# Patient Record
Sex: FEMALE | Race: BLACK OR AFRICAN AMERICAN | NOT HISPANIC OR LATINO | URBAN - METROPOLITAN AREA
[De-identification: names, ages, dates, MRNs, and addresses within clinical notes are randomized per-mention and may not be internally consistent; named-entity substitution may affect disease eponyms.]

---

## 2018-10-04 ENCOUNTER — OFFICE (OUTPATIENT)
Dept: URBAN - METROPOLITAN AREA CLINIC 10 | Facility: CLINIC | Age: 63
End: 2018-10-04

## 2018-10-04 VITALS
HEIGHT: 66 IN | WEIGHT: 252 LBS | DIASTOLIC BLOOD PRESSURE: 94 MMHG | SYSTOLIC BLOOD PRESSURE: 196 MMHG | HEART RATE: 51 BPM

## 2018-10-04 DIAGNOSIS — K59.00 CONSTIPATION, UNSPECIFIED: ICD-10-CM

## 2018-10-04 DIAGNOSIS — K76.0 FATTY (CHANGE OF) LIVER, NOT ELSEWHERE CLASSIFIED: ICD-10-CM

## 2018-10-04 DIAGNOSIS — K30 FUNCTIONAL DYSPEPSIA: ICD-10-CM

## 2018-10-04 LAB
COMP. METABOLIC PANEL (14): A/G RATIO: 1.4 (ref 1.2–2.2)
COMP. METABOLIC PANEL (14): ALBUMIN: 4.3 G/DL (ref 3.6–4.8)
COMP. METABOLIC PANEL (14): ALKALINE PHOSPHATASE: 201 IU/L — HIGH (ref 39–117)
COMP. METABOLIC PANEL (14): ALT (SGPT): 23 IU/L (ref 0–32)
COMP. METABOLIC PANEL (14): AST (SGOT): 15 IU/L (ref 0–40)
COMP. METABOLIC PANEL (14): BILIRUBIN, TOTAL: <0.2 MG/DL
COMP. METABOLIC PANEL (14): BUN/CREATININE RATIO: 16 (ref 12–28)
COMP. METABOLIC PANEL (14): BUN: 14 MG/DL (ref 8–27)
COMP. METABOLIC PANEL (14): CALCIUM: 9.7 MG/DL (ref 8.7–10.3)
COMP. METABOLIC PANEL (14): CARBON DIOXIDE, TOTAL: 25 MMOL/L (ref 20–29)
COMP. METABOLIC PANEL (14): CHLORIDE: 100 MMOL/L (ref 96–106)
COMP. METABOLIC PANEL (14): CREATININE: 0.89 MG/DL (ref 0.57–1)
COMP. METABOLIC PANEL (14): EGFR IF AFRICN AM: 81 ML/MIN/1.73 (ref 59–?)
COMP. METABOLIC PANEL (14): EGFR IF NONAFRICN AM: 70 ML/MIN/1.73 (ref 59–?)
COMP. METABOLIC PANEL (14): GLOBULIN, TOTAL: 3.1 G/DL (ref 1.5–4.5)
COMP. METABOLIC PANEL (14): GLUCOSE: 256 MG/DL — HIGH (ref 65–99)
COMP. METABOLIC PANEL (14): POTASSIUM: 4.7 MMOL/L (ref 3.5–5.2)
COMP. METABOLIC PANEL (14): PROTEIN, TOTAL: 7.4 G/DL (ref 6–8.5)
COMP. METABOLIC PANEL (14): SODIUM: 140 MMOL/L (ref 134–144)

## 2018-10-04 PROCEDURE — 99203 OFFICE O/P NEW LOW 30 MIN: CPT | Performed by: INTERNAL MEDICINE

## 2018-10-04 RX ORDER — PANTOPRAZOLE SODIUM 40 MG/1
40 TABLET, DELAYED RELEASE ORAL
Qty: 90 | Refills: 3 | Status: ACTIVE
Start: 2018-10-04

## 2018-11-14 ENCOUNTER — AMBULATORY SURGICAL CENTER (OUTPATIENT)
Dept: URBAN - METROPOLITAN AREA SURGERY 1 | Facility: SURGERY | Age: 63
End: 2018-11-14

## 2018-11-14 ENCOUNTER — AMBULATORY SURGICAL CENTER (OUTPATIENT)
Dept: URBAN - METROPOLITAN AREA SURGERY 1 | Facility: SURGERY | Age: 63
End: 2018-11-14
Payer: MEDICARE

## 2018-11-14 ENCOUNTER — OFFICE (OUTPATIENT)
Dept: URBAN - METROPOLITAN AREA PATHOLOGY 22 | Facility: PATHOLOGY | Age: 63
End: 2018-11-14

## 2018-11-14 VITALS
HEART RATE: 57 BPM | SYSTOLIC BLOOD PRESSURE: 134 MMHG | SYSTOLIC BLOOD PRESSURE: 126 MMHG | SYSTOLIC BLOOD PRESSURE: 148 MMHG | TEMPERATURE: 98.1 F | SYSTOLIC BLOOD PRESSURE: 117 MMHG | RESPIRATION RATE: 18 BRPM | HEART RATE: 57 BPM | RESPIRATION RATE: 16 BRPM | DIASTOLIC BLOOD PRESSURE: 57 MMHG | DIASTOLIC BLOOD PRESSURE: 70 MMHG | DIASTOLIC BLOOD PRESSURE: 70 MMHG | DIASTOLIC BLOOD PRESSURE: 62 MMHG | SYSTOLIC BLOOD PRESSURE: 116 MMHG | OXYGEN SATURATION: 97 % | DIASTOLIC BLOOD PRESSURE: 78 MMHG | DIASTOLIC BLOOD PRESSURE: 72 MMHG | SYSTOLIC BLOOD PRESSURE: 117 MMHG | TEMPERATURE: 98.1 F | TEMPERATURE: 98.6 F | OXYGEN SATURATION: 100 % | HEART RATE: 70 BPM | HEART RATE: 65 BPM | HEART RATE: 70 BPM | OXYGEN SATURATION: 97 % | DIASTOLIC BLOOD PRESSURE: 62 MMHG | SYSTOLIC BLOOD PRESSURE: 126 MMHG | RESPIRATION RATE: 18 BRPM | HEART RATE: 65 BPM | WEIGHT: 253 LBS | DIASTOLIC BLOOD PRESSURE: 78 MMHG | HEART RATE: 64 BPM | WEIGHT: 253 LBS | DIASTOLIC BLOOD PRESSURE: 57 MMHG | OXYGEN SATURATION: 100 % | OXYGEN SATURATION: 98 % | SYSTOLIC BLOOD PRESSURE: 134 MMHG | SYSTOLIC BLOOD PRESSURE: 116 MMHG | DIASTOLIC BLOOD PRESSURE: 72 MMHG | RESPIRATION RATE: 16 BRPM | TEMPERATURE: 98.6 F | HEART RATE: 64 BPM | OXYGEN SATURATION: 98 % | OXYGEN SATURATION: 93 % | OXYGEN SATURATION: 93 % | SYSTOLIC BLOOD PRESSURE: 148 MMHG | HEIGHT: 66 IN | HEIGHT: 66 IN

## 2018-11-14 DIAGNOSIS — K29.50 UNSPECIFIED CHRONIC GASTRITIS WITHOUT BLEEDING: ICD-10-CM

## 2018-11-14 DIAGNOSIS — B96.81 HELICOBACTER PYLORI [H. PYLORI] AS THE CAUSE OF DISEASES CLA: ICD-10-CM

## 2018-11-14 DIAGNOSIS — K30 FUNCTIONAL DYSPEPSIA: ICD-10-CM

## 2018-11-14 PROBLEM — K31.89 OTHER DISEASES OF STOMACH AND DUODENUM: Status: ACTIVE | Noted: 2018-11-14

## 2018-11-14 PROCEDURE — 88342 IMHCHEM/IMCYTCHM 1ST ANTB: CPT | Performed by: INTERNAL MEDICINE

## 2018-11-14 PROCEDURE — G8918 PT W/O PREOP ORDER IV AB PRO: HCPCS | Performed by: INTERNAL MEDICINE

## 2018-11-14 PROCEDURE — G8907 PT DOC NO EVENTS ON DISCHARG: HCPCS | Performed by: INTERNAL MEDICINE

## 2018-11-14 PROCEDURE — 43239 EGD BIOPSY SINGLE/MULTIPLE: CPT | Performed by: INTERNAL MEDICINE

## 2018-11-14 PROCEDURE — 88313 SPECIAL STAINS GROUP 2: CPT | Performed by: INTERNAL MEDICINE

## 2018-11-14 PROCEDURE — 88305 TISSUE EXAM BY PATHOLOGIST: CPT | Performed by: INTERNAL MEDICINE

## 2018-11-14 RX ORDER — LINACLOTIDE 290 UG/1
290 CAPSULE, GELATIN COATED ORAL
Qty: 90 | Refills: 3 | Status: COMPLETED
Start: 2018-11-14 | End: 2019-06-10

## 2019-02-18 ENCOUNTER — OFFICE (OUTPATIENT)
Dept: URBAN - METROPOLITAN AREA CLINIC 10 | Facility: CLINIC | Age: 64
End: 2019-02-18

## 2019-02-21 ENCOUNTER — OFFICE (OUTPATIENT)
Dept: URBAN - METROPOLITAN AREA CLINIC 10 | Facility: CLINIC | Age: 64
End: 2019-02-21

## 2019-02-21 VITALS
HEIGHT: 66 IN | WEIGHT: 257 LBS | HEART RATE: 45 BPM | SYSTOLIC BLOOD PRESSURE: 182 MMHG | DIASTOLIC BLOOD PRESSURE: 89 MMHG | SYSTOLIC BLOOD PRESSURE: 191 MMHG | DIASTOLIC BLOOD PRESSURE: 85 MMHG

## 2019-02-21 DIAGNOSIS — B96.81 HELICOBACTER PYLORI [H. PYLORI] AS THE CAUSE OF DISEASES CLA: ICD-10-CM

## 2019-02-21 DIAGNOSIS — K29.50 UNSPECIFIED CHRONIC GASTRITIS WITHOUT BLEEDING: ICD-10-CM

## 2019-02-21 DIAGNOSIS — K59.00 CONSTIPATION, UNSPECIFIED: ICD-10-CM

## 2019-02-21 PROCEDURE — 99213 OFFICE O/P EST LOW 20 MIN: CPT | Performed by: INTERNAL MEDICINE

## 2019-04-08 ENCOUNTER — OFFICE (OUTPATIENT)
Dept: URBAN - METROPOLITAN AREA CLINIC 10 | Facility: CLINIC | Age: 64
End: 2019-04-08

## 2019-04-08 VITALS
DIASTOLIC BLOOD PRESSURE: 84 MMHG | DIASTOLIC BLOOD PRESSURE: 94 MMHG | SYSTOLIC BLOOD PRESSURE: 185 MMHG | HEART RATE: 55 BPM | SYSTOLIC BLOOD PRESSURE: 174 MMHG | WEIGHT: 251 LBS | HEIGHT: 66 IN

## 2019-04-08 DIAGNOSIS — B96.81 HELICOBACTER PYLORI [H. PYLORI] AS THE CAUSE OF DISEASES CLA: ICD-10-CM

## 2019-04-08 DIAGNOSIS — K59.00 CONSTIPATION, UNSPECIFIED: ICD-10-CM

## 2019-04-08 DIAGNOSIS — K29.50 UNSPECIFIED CHRONIC GASTRITIS WITHOUT BLEEDING: ICD-10-CM

## 2019-04-08 PROCEDURE — 99213 OFFICE O/P EST LOW 20 MIN: CPT | Performed by: INTERNAL MEDICINE

## 2019-08-27 ENCOUNTER — AMBULATORY SURGICAL CENTER (OUTPATIENT)
Dept: URBAN - METROPOLITAN AREA SURGERY 1 | Facility: SURGERY | Age: 64
End: 2019-08-27

## 2019-08-27 ENCOUNTER — OFFICE (OUTPATIENT)
Dept: URBAN - METROPOLITAN AREA PATHOLOGY 22 | Facility: PATHOLOGY | Age: 64
End: 2019-08-27

## 2019-08-27 ENCOUNTER — AMBULATORY SURGICAL CENTER (OUTPATIENT)
Dept: URBAN - METROPOLITAN AREA SURGERY 1 | Facility: SURGERY | Age: 64
End: 2019-08-27
Payer: MEDICARE

## 2019-08-27 VITALS
SYSTOLIC BLOOD PRESSURE: 167 MMHG | OXYGEN SATURATION: 96 % | HEART RATE: 57 BPM | SYSTOLIC BLOOD PRESSURE: 171 MMHG | DIASTOLIC BLOOD PRESSURE: 74 MMHG | HEART RATE: 54 BPM | WEIGHT: 247 LBS | RESPIRATION RATE: 16 BRPM | SYSTOLIC BLOOD PRESSURE: 164 MMHG | RESPIRATION RATE: 16 BRPM | DIASTOLIC BLOOD PRESSURE: 80 MMHG | SYSTOLIC BLOOD PRESSURE: 167 MMHG | OXYGEN SATURATION: 96 % | DIASTOLIC BLOOD PRESSURE: 74 MMHG | HEART RATE: 78 BPM | OXYGEN SATURATION: 97 % | SYSTOLIC BLOOD PRESSURE: 171 MMHG | HEIGHT: 66 IN | TEMPERATURE: 98.5 F | HEIGHT: 66 IN | HEART RATE: 50 BPM | OXYGEN SATURATION: 94 % | DIASTOLIC BLOOD PRESSURE: 74 MMHG | OXYGEN SATURATION: 94 % | TEMPERATURE: 98.5 F | HEART RATE: 50 BPM | SYSTOLIC BLOOD PRESSURE: 178 MMHG | SYSTOLIC BLOOD PRESSURE: 171 MMHG | WEIGHT: 247 LBS | SYSTOLIC BLOOD PRESSURE: 164 MMHG | OXYGEN SATURATION: 97 % | DIASTOLIC BLOOD PRESSURE: 69 MMHG | HEART RATE: 49 BPM | RESPIRATION RATE: 16 BRPM | DIASTOLIC BLOOD PRESSURE: 78 MMHG | SYSTOLIC BLOOD PRESSURE: 178 MMHG | DIASTOLIC BLOOD PRESSURE: 78 MMHG | TEMPERATURE: 98 F | SYSTOLIC BLOOD PRESSURE: 167 MMHG | WEIGHT: 247 LBS | SYSTOLIC BLOOD PRESSURE: 144 MMHG | HEART RATE: 57 BPM | TEMPERATURE: 98.5 F | TEMPERATURE: 98 F | SYSTOLIC BLOOD PRESSURE: 178 MMHG | HEART RATE: 57 BPM | OXYGEN SATURATION: 97 % | DIASTOLIC BLOOD PRESSURE: 80 MMHG | HEIGHT: 66 IN | SYSTOLIC BLOOD PRESSURE: 144 MMHG | DIASTOLIC BLOOD PRESSURE: 80 MMHG | DIASTOLIC BLOOD PRESSURE: 69 MMHG | DIASTOLIC BLOOD PRESSURE: 78 MMHG | HEART RATE: 49 BPM | HEART RATE: 50 BPM | DIASTOLIC BLOOD PRESSURE: 69 MMHG | SYSTOLIC BLOOD PRESSURE: 144 MMHG | TEMPERATURE: 98 F | HEART RATE: 54 BPM | OXYGEN SATURATION: 94 % | OXYGEN SATURATION: 96 % | HEART RATE: 49 BPM | SYSTOLIC BLOOD PRESSURE: 164 MMHG | HEART RATE: 78 BPM | HEART RATE: 54 BPM | HEART RATE: 78 BPM

## 2019-08-27 DIAGNOSIS — K29.50 UNSPECIFIED CHRONIC GASTRITIS WITHOUT BLEEDING: ICD-10-CM

## 2019-08-27 DIAGNOSIS — B96.81 HELICOBACTER PYLORI [H. PYLORI] AS THE CAUSE OF DISEASES CLA: ICD-10-CM

## 2019-08-27 LAB
HELICOBACTER PYLORI CULTURE: (no result)

## 2019-08-27 PROCEDURE — 43239 EGD BIOPSY SINGLE/MULTIPLE: CPT | Performed by: INTERNAL MEDICINE

## 2019-08-27 PROCEDURE — G8907 PT DOC NO EVENTS ON DISCHARG: HCPCS | Performed by: INTERNAL MEDICINE

## 2019-08-27 PROCEDURE — G8918 PT W/O PREOP ORDER IV AB PRO: HCPCS | Performed by: INTERNAL MEDICINE

## 2019-08-27 PROCEDURE — 88341 IMHCHEM/IMCYTCHM EA ADD ANTB: CPT | Performed by: INTERNAL MEDICINE

## 2019-08-27 PROCEDURE — 88313 SPECIAL STAINS GROUP 2: CPT | Performed by: INTERNAL MEDICINE

## 2019-08-27 PROCEDURE — 88342 IMHCHEM/IMCYTCHM 1ST ANTB: CPT | Performed by: INTERNAL MEDICINE

## 2019-08-27 PROCEDURE — 88305 TISSUE EXAM BY PATHOLOGIST: CPT | Performed by: INTERNAL MEDICINE

## 2021-11-18 ENCOUNTER — HOSPITAL ENCOUNTER (OUTPATIENT)
Dept: GENERAL RADIOLOGY | Age: 66
Discharge: HOME | End: 2021-11-18
Attending: ORTHOPAEDIC SURGERY
Payer: COMMERCIAL

## 2021-11-18 DIAGNOSIS — S46.912D STRAIN OF LEFT SHOULDER, SUBSEQUENT ENCOUNTER: ICD-10-CM

## 2021-11-18 PROCEDURE — 73030 X-RAY EXAM OF SHOULDER: CPT | Performed by: ORTHOPAEDIC SURGERY

## 2022-09-16 RX ORDER — QUINAPRIL 20 MG/1
20 TABLET ORAL DAILY
COMMUNITY

## 2022-09-16 RX ORDER — ATORVASTATIN CALCIUM 40 MG/1
40 TABLET, FILM COATED ORAL NIGHTLY
COMMUNITY

## 2022-09-16 RX ORDER — GLIPIZIDE 5 MG/1
5 TABLET ORAL
COMMUNITY

## 2022-09-16 RX ORDER — NAPROXEN SODIUM 220 MG
1-2 TABLET ORAL AS NEEDED
COMMUNITY

## 2022-09-16 RX ORDER — HYDROCODONE BITARTRATE AND ACETAMINOPHEN 10; 325 MG/1; MG/1
1 TABLET ORAL EVERY 6 HOURS PRN
COMMUNITY

## 2022-09-19 ENCOUNTER — LAB ENCOUNTER (OUTPATIENT)
Dept: LAB | Age: 67
End: 2022-09-19
Attending: OPHTHALMOLOGY

## 2022-09-19 DIAGNOSIS — Z01.818 PRE-OP TESTING: ICD-10-CM

## 2022-09-19 NOTE — PAT NURSING NOTE
S/w pt re: per pt she was told that her surgery should be  RIGHT eye, not LEFT (as written on the SSRF). LM to Andreia Paiz at Dr [facility] to verify with MD and update and fax SSRF to [facility]

## 2022-09-19 NOTE — PAT NURSING NOTE
LM with Sarah Kay from Dr. Selene Haley  office ( cardiologist ) to fax completed device communication tool for pt.

## 2022-09-19 NOTE — PAT NURSING NOTE
Cardiologist office's fax machines are down .  Cardiac device communication sheet emailed to Viola Palacios [phone] at [email or web address] .

## 2022-09-20 LAB — SARS-COV-2 RNA RESP QL NAA+PROBE: NOT DETECTED

## 2022-09-20 NOTE — PAT NURSING NOTE
[phone] stating right eye is surgical eye. 1140: Spoke with Chris at Dr. Keith Pollard office. In office, notes states right eye before left eye for surgery. Will verify with Kait Rosas in office and follow up with PAT. Per Surgery scheduling, left eye is correct for surgery on 9/21/22.

## 2022-09-20 NOTE — PAT NURSING NOTE
7718: Message left with Kevin Landin atcardiology office regarding returning Cardiac Device Communication Tool. 1000: Laura Palomo returned call and stated that she would be printing tool and giving to RN in device clinic to complete. Requested to be emailed back to Preadmission testing office or James Ohara RN at either email. 1310: PAT office has not received tool, message left for Kevin Landin at cardiology office to follow up on status of tool.

## 2022-09-21 ENCOUNTER — HOSPITAL ENCOUNTER (OUTPATIENT)
Facility: HOSPITAL | Age: 67
Setting detail: HOSPITAL OUTPATIENT SURGERY
Discharge: HOME | End: 2022-09-21
Attending: OPHTHALMOLOGY | Admitting: OPHTHALMOLOGY

## 2022-09-21 ENCOUNTER — ANESTHESIA EVENT (OUTPATIENT)
Dept: SURGERY | Facility: HOSPITAL | Age: 67
End: 2022-09-21

## 2022-09-21 ENCOUNTER — ANESTHESIA (OUTPATIENT)
Dept: SURGERY | Facility: HOSPITAL | Age: 67
End: 2022-09-21

## 2022-09-21 VITALS
DIASTOLIC BLOOD PRESSURE: 71 MMHG | HEIGHT: 66 IN | TEMPERATURE: 97 F | RESPIRATION RATE: 16 BRPM | WEIGHT: 237 LBS | SYSTOLIC BLOOD PRESSURE: 172 MMHG | OXYGEN SATURATION: 100 % | BODY MASS INDEX: 38.09 KG/M2 | HEART RATE: 64 BPM

## 2022-09-21 DIAGNOSIS — Z01.818 PRE-OP TESTING: Primary | ICD-10-CM

## 2022-09-21 LAB
GLUCOSE BLDC GLUCOMTR-MCNC: 136 MG/DL (ref 70–99)
GLUCOSE BLDC GLUCOMTR-MCNC: 156 MG/DL (ref 70–99)

## 2022-09-21 PROCEDURE — 82962 GLUCOSE BLOOD TEST: CPT

## 2022-09-21 PROCEDURE — 08RK3JZ REPLACEMENT OF LEFT LENS WITH SYNTHETIC SUBSTITUTE, PERCUTANEOUS APPROACH: ICD-10-PCS | Performed by: OPHTHALMOLOGY

## 2022-09-21 DEVICE — ACRYSOF(R) NATURAL SINGLE-PIECE ACRYLICFOLDABLE IOL, UV WITH BLUE LIGHT FILTER,POSTERIOR CHAMBER LENS (IOL/PC), 13.0MM LENGTH,6.0MM BICONVEX OPTIC, PLANAR HAPTICS.
Type: IMPLANTABLE DEVICE | Site: EYE | Status: FUNCTIONAL
Brand: ACRYSOF®

## 2022-09-21 RX ORDER — KETOROLAC TROMETHAMINE 5 MG/ML
1 SOLUTION OPHTHALMIC
Status: COMPLETED | OUTPATIENT
Start: 2022-09-21 | End: 2022-09-21

## 2022-09-21 RX ORDER — MORPHINE SULFATE 4 MG/ML
4 INJECTION, SOLUTION INTRAMUSCULAR; INTRAVENOUS EVERY 10 MIN PRN
Status: DISCONTINUED | OUTPATIENT
Start: 2022-09-21 | End: 2022-09-21

## 2022-09-21 RX ORDER — BALANCED SALT SOLUTION 6.4; .75; .48; .3; 3.9; 1.7 MG/ML; MG/ML; MG/ML; MG/ML; MG/ML; MG/ML
SOLUTION OPHTHALMIC AS NEEDED
Status: DISCONTINUED | OUTPATIENT
Start: 2022-09-21 | End: 2022-09-21 | Stop reason: HOSPADM

## 2022-09-21 RX ORDER — METOCLOPRAMIDE 10 MG/1
10 TABLET ORAL ONCE
Status: COMPLETED | OUTPATIENT
Start: 2022-09-21 | End: 2022-09-21

## 2022-09-21 RX ORDER — FAMOTIDINE 20 MG/1
20 TABLET, FILM COATED ORAL ONCE
Status: COMPLETED | OUTPATIENT
Start: 2022-09-21 | End: 2022-09-21

## 2022-09-21 RX ORDER — HYDROMORPHONE HYDROCHLORIDE 1 MG/ML
0.4 INJECTION, SOLUTION INTRAMUSCULAR; INTRAVENOUS; SUBCUTANEOUS EVERY 5 MIN PRN
Status: DISCONTINUED | OUTPATIENT
Start: 2022-09-21 | End: 2022-09-21

## 2022-09-21 RX ORDER — ONDANSETRON 2 MG/ML
4 INJECTION INTRAMUSCULAR; INTRAVENOUS EVERY 6 HOURS PRN
Status: DISCONTINUED | OUTPATIENT
Start: 2022-09-21 | End: 2022-09-21

## 2022-09-21 RX ORDER — PHENYLEPHRINE HCL 2.5 %
2 DROPS OPHTHALMIC (EYE)
Status: COMPLETED | OUTPATIENT
Start: 2022-09-21 | End: 2022-09-21

## 2022-09-21 RX ORDER — CIPROFLOXACIN HYDROCHLORIDE 3.5 MG/ML
SOLUTION/ DROPS TOPICAL AS NEEDED
Status: DISCONTINUED | OUTPATIENT
Start: 2022-09-21 | End: 2022-09-21 | Stop reason: HOSPADM

## 2022-09-21 RX ORDER — ACETAMINOPHEN 500 MG
1000 TABLET ORAL ONCE
Status: COMPLETED | OUTPATIENT
Start: 2022-09-21 | End: 2022-09-21

## 2022-09-21 RX ORDER — CIPROFLOXACIN HYDROCHLORIDE 3.5 MG/ML
2 SOLUTION/ DROPS TOPICAL
Status: COMPLETED | OUTPATIENT
Start: 2022-09-21 | End: 2022-09-21

## 2022-09-21 RX ORDER — HYDROMORPHONE HYDROCHLORIDE 1 MG/ML
0.6 INJECTION, SOLUTION INTRAMUSCULAR; INTRAVENOUS; SUBCUTANEOUS EVERY 5 MIN PRN
Status: DISCONTINUED | OUTPATIENT
Start: 2022-09-21 | End: 2022-09-21

## 2022-09-21 RX ORDER — LIDOCAINE HYDROCHLORIDE 10 MG/ML
INJECTION, SOLUTION EPIDURAL; INFILTRATION; INTRACAUDAL; PERINEURAL AS NEEDED
Status: DISCONTINUED | OUTPATIENT
Start: 2022-09-21 | End: 2022-09-21 | Stop reason: SURG

## 2022-09-21 RX ORDER — TROPICAMIDE 10 MG/ML
2 SOLUTION/ DROPS OPHTHALMIC
Status: COMPLETED | OUTPATIENT
Start: 2022-09-21 | End: 2022-09-21

## 2022-09-21 RX ORDER — GLYCOPYRROLATE 0.2 MG/ML
INJECTION, SOLUTION INTRAMUSCULAR; INTRAVENOUS AS NEEDED
Status: DISCONTINUED | OUTPATIENT
Start: 2022-09-21 | End: 2022-09-21 | Stop reason: SURG

## 2022-09-21 RX ORDER — NICOTINE POLACRILEX 4 MG
30 LOZENGE BUCCAL
Status: DISCONTINUED | OUTPATIENT
Start: 2022-09-21 | End: 2022-09-21

## 2022-09-21 RX ORDER — DEXAMETHASONE SODIUM PHOSPHATE 4 MG/ML
VIAL (ML) INJECTION AS NEEDED
Status: DISCONTINUED | OUTPATIENT
Start: 2022-09-21 | End: 2022-09-21 | Stop reason: HOSPADM

## 2022-09-21 RX ORDER — SODIUM CHLORIDE, SODIUM LACTATE, POTASSIUM CHLORIDE, CALCIUM CHLORIDE 600; 310; 30; 20 MG/100ML; MG/100ML; MG/100ML; MG/100ML
INJECTION, SOLUTION INTRAVENOUS CONTINUOUS
Status: DISCONTINUED | OUTPATIENT
Start: 2022-09-21 | End: 2022-09-21

## 2022-09-21 RX ORDER — DEXTROSE MONOHYDRATE 25 G/50ML
50 INJECTION, SOLUTION INTRAVENOUS
Status: DISCONTINUED | OUTPATIENT
Start: 2022-09-21 | End: 2022-09-21

## 2022-09-21 RX ORDER — METOCLOPRAMIDE HYDROCHLORIDE 5 MG/ML
10 INJECTION INTRAMUSCULAR; INTRAVENOUS EVERY 8 HOURS PRN
Status: DISCONTINUED | OUTPATIENT
Start: 2022-09-21 | End: 2022-09-21

## 2022-09-21 RX ORDER — MIDAZOLAM HYDROCHLORIDE 1 MG/ML
INJECTION INTRAMUSCULAR; INTRAVENOUS AS NEEDED
Status: DISCONTINUED | OUTPATIENT
Start: 2022-09-21 | End: 2022-09-21 | Stop reason: SURG

## 2022-09-21 RX ORDER — NICOTINE POLACRILEX 4 MG
15 LOZENGE BUCCAL
Status: DISCONTINUED | OUTPATIENT
Start: 2022-09-21 | End: 2022-09-21

## 2022-09-21 RX ORDER — NALOXONE HYDROCHLORIDE 0.4 MG/ML
80 INJECTION, SOLUTION INTRAMUSCULAR; INTRAVENOUS; SUBCUTANEOUS AS NEEDED
Status: DISCONTINUED | OUTPATIENT
Start: 2022-09-21 | End: 2022-09-21

## 2022-09-21 RX ORDER — MORPHINE SULFATE 4 MG/ML
2 INJECTION, SOLUTION INTRAMUSCULAR; INTRAVENOUS EVERY 10 MIN PRN
Status: DISCONTINUED | OUTPATIENT
Start: 2022-09-21 | End: 2022-09-21

## 2022-09-21 RX ORDER — HYDROMORPHONE HYDROCHLORIDE 1 MG/ML
0.2 INJECTION, SOLUTION INTRAMUSCULAR; INTRAVENOUS; SUBCUTANEOUS EVERY 5 MIN PRN
Status: DISCONTINUED | OUTPATIENT
Start: 2022-09-21 | End: 2022-09-21

## 2022-09-21 RX ORDER — MORPHINE SULFATE 10 MG/ML
6 INJECTION, SOLUTION INTRAMUSCULAR; INTRAVENOUS EVERY 10 MIN PRN
Status: DISCONTINUED | OUTPATIENT
Start: 2022-09-21 | End: 2022-09-21

## 2022-09-21 RX ADMIN — MIDAZOLAM HYDROCHLORIDE 2 MG: 1 INJECTION INTRAMUSCULAR; INTRAVENOUS at 08:53:00

## 2022-09-21 RX ADMIN — GLYCOPYRROLATE 0.1 MG: 0.2 INJECTION, SOLUTION INTRAMUSCULAR; INTRAVENOUS at 08:56:00

## 2022-09-21 RX ADMIN — SODIUM CHLORIDE, SODIUM LACTATE, POTASSIUM CHLORIDE, CALCIUM CHLORIDE: 600; 310; 30; 20 INJECTION, SOLUTION INTRAVENOUS at 08:53:00

## 2022-09-21 RX ADMIN — LIDOCAINE HYDROCHLORIDE 25 MG: 10 INJECTION, SOLUTION EPIDURAL; INFILTRATION; INTRACAUDAL; PERINEURAL at 09:01:00

## 2022-09-21 RX ADMIN — SODIUM CHLORIDE, SODIUM LACTATE, POTASSIUM CHLORIDE, CALCIUM CHLORIDE: 600; 310; 30; 20 INJECTION, SOLUTION INTRAVENOUS at 09:32:00

## 2022-09-21 NOTE — H&P
History & Physical Examination    Name: Valerie Muñoz  Patient ID:  [id number]  Date:  9/21/2022  YOB: 1955 AGE:  73year old SEX:  female      M.D./D.O./D.P.M PERFORMING SURGERY/PROCEDURE:    Vianca Moralez MD    Diagnosis:  Age related nuclear cataract os    Procedure: Left eye cataract extraction, intraocular lens insertion    Anesthesia Type: MAC    Allergies:   Radiology Contrast *    HIVES  Sulfa Antibiotics       HIVES  Penicillins             RASH    Medications:  No current outpatient medications on file. Medical  History:  has a past medical history of Arrhythmia (bradicardia), Deep vein thrombosis ([address] (lt arm 2020), Diabetes ([address] High blood pressure, High cholesterol, Sleep apnea (no machine), and Visual impairment (glasses). She has no past medical history of Anesthesia complication, Difficult intubation, Family history of malignant hyperthermia, Family history of pseudocholinesterase deficiency, History of blood transfusion, motion sickness, Malignant hyperthermia, PONV (postoperative nausea and vomiting), Pseudocholinesterase deficiency, or Pulmonary embolism ([address] Surgical History:  has a past surgical history that includes Cardiac pacemaker placement (2020). Family History: family history is not on file. Physical Exam:     System Review is Normal Exam is Normal If not normal, explain   HEENT no no Dec va with cat os   NECK & BACK yes yes    HEART yes yes    LUNGS yes yes    ABDOMEN yes yes    UROGENITAL yes yes    EXTREMITIES yes yes    MENTAL STATUS yes yes        Impression: nsc os    Plan:  Ce/iol os    I have discussed the risks and benefits and alternatives with the patient/family. They understand and agree to proceed with the plan of care. Yes    Interval Note:    I have reviewed the History and Physical done within the last 30 days. Any changes are noted above.      Nuiba Ricks MD 09/21/22

## 2022-09-21 NOTE — BRIEF OP NOTE
Pre-Operative Diagnosis: Age related nuclear cataract os     Post-Operative Diagnosis: Age related nuclear cataract os     Procedure Performed:   Left eye cataract extraction, intraocular lens insertion    Surgeon(s) and Role:     Steve Mendoza MD - Primary    Anesthesia:  RBB     Estimated Blood Loss: Blood Output: 0 mL (9/21/2022  3:93 AM)    Complications: None    Nroa Alicea MD  9/21/2022  9:45 AM

## 2022-09-22 NOTE — OPERATIVE REPORT
[facility]    PATIENT'S NAME: Yuri Clarke   ATTENDING PHYSICIAN: Jamaal Davidson MD   OPERATING PHYSICIAN: Jamaal Davidson MD   PATIENT ACCOUNT#:   [de-identified]    LOCATION:  [place] [address]  MEDICAL RECORD #:   [id number]       YOB: 1955  ADMISSION DATE:       09/21/2022      OPERATION DATE:  09/21/2022    OPERATIVE REPORT      PREOPERATIVE DIAGNOSIS:  Age-related nuclear sclerotic cataract, left eye. POSTOPERATIVE DIAGNOSIS:  Age-related nuclear sclerotic cataract, left eye. PROCEDURE:  Cataract extraction with phacoemulsification and placement of posterior chamber intraocular lens, left eye. ANESTHESIA:  Retrobulbar block. IMPLANTS:  Jose F SN60AT, 20.5 diopter, serial number [id number] posterior chamber intraocular lens. COMPLICATIONS:  None. INDICATIONS:  The patient presents with complaints of decreased vision in her left eye significantly affecting her activities of daily living. Clinical examination is notable for a visually significant age-related nuclear sclerotic cataract in the left eye significantly impairing her activities of daily living. After a thorough discussion of the risks, benefits and alternatives to cataract surgery was carried out, the patient elected to proceed with this procedure. OPERATIVE TECHNIQUE:  After proper informed consent was obtained, the patient was transferred to the operating room. The left eye was then prepped and draped in the usual sterile ophthalmic fashion for surgery. Lid speculum was placed. The conjunctival surface was irrigated and swabbed with dilute Betadine, followed by balanced salt solution. A 1 mm sideport blade was used to fashion an inferotemporal paracentesis at the clear corneal limbus. Viscoelastic agent was injected into the anterior chamber. A 2.5 mm keratome blade was used to fashion a temporal clear corneal incision. It was enlarged slightly inferiorly.   A cystotome needle was used to initiate a capsulorrhexis, which was then completed using Utrata forceps. Balanced salt solution was injected under the lip of the anterior capsule in order to hydrodissect the lens and create a nice fluid wave. The phacoemulsification probe was used to remove the lens nucleus. The remaining cortical material was removed with the irrigation/aspiration probe. Viscoelastic agent was used to fill the capsular bag, and the posterior chamber intraocular lens was then dialed into place. Haile hooks were used to ensure that the haptics were within the capsular bag. The irrigation/aspiration probe was used to aspirate the remaining viscoelastic. BSS was used to hydrate the wounds and to fill the anterior chamber to an appropriate depth and pressure. Subconjunctival ciprofloxacin and dexamethasone were then administered. Speculum and drapes removed. Operative area was cleaned and dressed with a patch. The patient was transferred to the PACU in stable condition, tolerated the procedure well without any complication.      Dictated By Troy Ware MD  d: 09/21/2022 09:49:44  t: 09/21/2022 18:54:50  [facility] [id number]  IX/

## 2022-10-25 ENCOUNTER — LAB ENCOUNTER (OUTPATIENT)
Dept: LAB | Facility: HOSPITAL | Age: 67
End: 2022-10-25
Attending: OPHTHALMOLOGY
Payer: MEDICARE

## 2022-10-25 DIAGNOSIS — Z01.818 PREOP TESTING: ICD-10-CM

## 2022-10-25 LAB — SARS-COV-2 RNA RESP QL NAA+PROBE: NOT DETECTED

## 2022-10-26 ENCOUNTER — HOSPITAL ENCOUNTER (OUTPATIENT)
Facility: HOSPITAL | Age: 67
Setting detail: HOSPITAL OUTPATIENT SURGERY
Discharge: HOME | End: 2022-10-26
Attending: OPHTHALMOLOGY | Admitting: OPHTHALMOLOGY
Payer: MEDICARE

## 2022-10-26 ENCOUNTER — ANESTHESIA (OUTPATIENT)
Dept: SURGERY | Facility: HOSPITAL | Age: 67
End: 2022-10-26
Payer: MEDICARE

## 2022-10-26 ENCOUNTER — ANESTHESIA EVENT (OUTPATIENT)
Dept: SURGERY | Facility: HOSPITAL | Age: 67
End: 2022-10-26
Payer: MEDICARE

## 2022-10-26 VITALS
DIASTOLIC BLOOD PRESSURE: 68 MMHG | WEIGHT: 233 LBS | OXYGEN SATURATION: 98 % | BODY MASS INDEX: 37.45 KG/M2 | HEART RATE: 59 BPM | HEIGHT: 66 IN | TEMPERATURE: 98 F | SYSTOLIC BLOOD PRESSURE: 141 MMHG | RESPIRATION RATE: 16 BRPM

## 2022-10-26 DIAGNOSIS — Z01.818 PREOP TESTING: Primary | ICD-10-CM

## 2022-10-26 LAB
GLUCOSE BLDC GLUCOMTR-MCNC: 132 MG/DL (ref 70–99)
GLUCOSE BLDC GLUCOMTR-MCNC: 96 MG/DL (ref 70–99)

## 2022-10-26 PROCEDURE — 82962 GLUCOSE BLOOD TEST: CPT

## 2022-10-26 PROCEDURE — 08RJ3JZ REPLACEMENT OF RIGHT LENS WITH SYNTHETIC SUBSTITUTE, PERCUTANEOUS APPROACH: ICD-10-PCS | Performed by: OPHTHALMOLOGY

## 2022-10-26 RX ORDER — DEXAMETHASONE SODIUM PHOSPHATE 4 MG/ML
VIAL (ML) INJECTION AS NEEDED
Status: DISCONTINUED | OUTPATIENT
Start: 2022-10-26 | End: 2022-10-26 | Stop reason: HOSPADM

## 2022-10-26 RX ORDER — ONDANSETRON 2 MG/ML
4 INJECTION INTRAMUSCULAR; INTRAVENOUS EVERY 6 HOURS PRN
Status: DISCONTINUED | OUTPATIENT
Start: 2022-10-26 | End: 2022-10-26

## 2022-10-26 RX ORDER — CIPROFLOXACIN HYDROCHLORIDE 3.5 MG/ML
SOLUTION/ DROPS TOPICAL AS NEEDED
Status: DISCONTINUED | OUTPATIENT
Start: 2022-10-26 | End: 2022-10-26 | Stop reason: HOSPADM

## 2022-10-26 RX ORDER — KETAMINE HYDROCHLORIDE 50 MG/ML
INJECTION, SOLUTION, CONCENTRATE INTRAMUSCULAR; INTRAVENOUS AS NEEDED
Status: DISCONTINUED | OUTPATIENT
Start: 2022-10-26 | End: 2022-10-26 | Stop reason: SURG

## 2022-10-26 RX ORDER — MORPHINE SULFATE 10 MG/ML
6 INJECTION, SOLUTION INTRAMUSCULAR; INTRAVENOUS EVERY 10 MIN PRN
Status: DISCONTINUED | OUTPATIENT
Start: 2022-10-26 | End: 2022-10-26

## 2022-10-26 RX ORDER — HYDROMORPHONE HYDROCHLORIDE 1 MG/ML
0.4 INJECTION, SOLUTION INTRAMUSCULAR; INTRAVENOUS; SUBCUTANEOUS EVERY 5 MIN PRN
Status: DISCONTINUED | OUTPATIENT
Start: 2022-10-26 | End: 2022-10-26

## 2022-10-26 RX ORDER — MORPHINE SULFATE 4 MG/ML
4 INJECTION, SOLUTION INTRAMUSCULAR; INTRAVENOUS EVERY 10 MIN PRN
Status: DISCONTINUED | OUTPATIENT
Start: 2022-10-26 | End: 2022-10-26

## 2022-10-26 RX ORDER — ACETAMINOPHEN 500 MG
1000 TABLET ORAL ONCE
Status: COMPLETED | OUTPATIENT
Start: 2022-10-26 | End: 2022-10-26

## 2022-10-26 RX ORDER — CIPROFLOXACIN HYDROCHLORIDE 3.5 MG/ML
2 SOLUTION/ DROPS TOPICAL SEE ADMIN INSTRUCTIONS
Status: COMPLETED | OUTPATIENT
Start: 2022-10-26 | End: 2022-10-26

## 2022-10-26 RX ORDER — KETOROLAC TROMETHAMINE 5 MG/ML
1 SOLUTION OPHTHALMIC SEE ADMIN INSTRUCTIONS
Status: COMPLETED | OUTPATIENT
Start: 2022-10-26 | End: 2022-10-26

## 2022-10-26 RX ORDER — GLYCOPYRROLATE 0.2 MG/ML
INJECTION, SOLUTION INTRAMUSCULAR; INTRAVENOUS AS NEEDED
Status: DISCONTINUED | OUTPATIENT
Start: 2022-10-26 | End: 2022-10-26 | Stop reason: SURG

## 2022-10-26 RX ORDER — TROPICAMIDE 10 MG/ML
2 SOLUTION/ DROPS OPHTHALMIC SEE ADMIN INSTRUCTIONS
Status: COMPLETED | OUTPATIENT
Start: 2022-10-26 | End: 2022-10-26

## 2022-10-26 RX ORDER — MORPHINE SULFATE 4 MG/ML
2 INJECTION, SOLUTION INTRAMUSCULAR; INTRAVENOUS EVERY 10 MIN PRN
Status: DISCONTINUED | OUTPATIENT
Start: 2022-10-26 | End: 2022-10-26

## 2022-10-26 RX ORDER — SODIUM CHLORIDE, SODIUM LACTATE, POTASSIUM CHLORIDE, CALCIUM CHLORIDE 600; 310; 30; 20 MG/100ML; MG/100ML; MG/100ML; MG/100ML
INJECTION, SOLUTION INTRAVENOUS CONTINUOUS
Status: DISCONTINUED | OUTPATIENT
Start: 2022-10-26 | End: 2022-10-26

## 2022-10-26 RX ORDER — FAMOTIDINE 20 MG/1
20 TABLET, FILM COATED ORAL ONCE
Status: COMPLETED | OUTPATIENT
Start: 2022-10-26 | End: 2022-10-26

## 2022-10-26 RX ORDER — METOCLOPRAMIDE 10 MG/1
10 TABLET ORAL ONCE
Status: COMPLETED | OUTPATIENT
Start: 2022-10-26 | End: 2022-10-26

## 2022-10-26 RX ORDER — NICOTINE POLACRILEX 4 MG
15 LOZENGE BUCCAL
Status: DISCONTINUED | OUTPATIENT
Start: 2022-10-26 | End: 2022-10-26 | Stop reason: HOSPADM

## 2022-10-26 RX ORDER — TETRACAINE HYDROCHLORIDE 5 MG/ML
SOLUTION OPHTHALMIC AS NEEDED
Status: DISCONTINUED | OUTPATIENT
Start: 2022-10-26 | End: 2022-10-26 | Stop reason: HOSPADM

## 2022-10-26 RX ORDER — DEXTROSE MONOHYDRATE 25 G/50ML
50 INJECTION, SOLUTION INTRAVENOUS
Status: DISCONTINUED | OUTPATIENT
Start: 2022-10-26 | End: 2022-10-26 | Stop reason: HOSPADM

## 2022-10-26 RX ORDER — NALOXONE HYDROCHLORIDE 0.4 MG/ML
80 INJECTION, SOLUTION INTRAMUSCULAR; INTRAVENOUS; SUBCUTANEOUS AS NEEDED
Status: DISCONTINUED | OUTPATIENT
Start: 2022-10-26 | End: 2022-10-26

## 2022-10-26 RX ORDER — HYDROMORPHONE HYDROCHLORIDE 1 MG/ML
0.6 INJECTION, SOLUTION INTRAMUSCULAR; INTRAVENOUS; SUBCUTANEOUS EVERY 5 MIN PRN
Status: DISCONTINUED | OUTPATIENT
Start: 2022-10-26 | End: 2022-10-26

## 2022-10-26 RX ORDER — MIDAZOLAM HYDROCHLORIDE 1 MG/ML
INJECTION INTRAMUSCULAR; INTRAVENOUS AS NEEDED
Status: DISCONTINUED | OUTPATIENT
Start: 2022-10-26 | End: 2022-10-26 | Stop reason: SURG

## 2022-10-26 RX ORDER — BALANCED SALT SOLUTION 6.4; .75; .48; .3; 3.9; 1.7 MG/ML; MG/ML; MG/ML; MG/ML; MG/ML; MG/ML
SOLUTION OPHTHALMIC AS NEEDED
Status: DISCONTINUED | OUTPATIENT
Start: 2022-10-26 | End: 2022-10-26 | Stop reason: HOSPADM

## 2022-10-26 RX ORDER — PHENYLEPHRINE HCL 2.5 %
2 DROPS OPHTHALMIC (EYE) SEE ADMIN INSTRUCTIONS
Status: COMPLETED | OUTPATIENT
Start: 2022-10-26 | End: 2022-10-26

## 2022-10-26 RX ORDER — METOCLOPRAMIDE HYDROCHLORIDE 5 MG/ML
10 INJECTION INTRAMUSCULAR; INTRAVENOUS EVERY 8 HOURS PRN
Status: DISCONTINUED | OUTPATIENT
Start: 2022-10-26 | End: 2022-10-26

## 2022-10-26 RX ORDER — NICOTINE POLACRILEX 4 MG
30 LOZENGE BUCCAL
Status: DISCONTINUED | OUTPATIENT
Start: 2022-10-26 | End: 2022-10-26 | Stop reason: HOSPADM

## 2022-10-26 RX ORDER — HYDROMORPHONE HYDROCHLORIDE 1 MG/ML
0.2 INJECTION, SOLUTION INTRAMUSCULAR; INTRAVENOUS; SUBCUTANEOUS EVERY 5 MIN PRN
Status: DISCONTINUED | OUTPATIENT
Start: 2022-10-26 | End: 2022-10-26

## 2022-10-26 RX ADMIN — KETAMINE HYDROCHLORIDE 20 MG: 50 INJECTION, SOLUTION, CONCENTRATE INTRAMUSCULAR; INTRAVENOUS at 11:45:00

## 2022-10-26 RX ADMIN — GLYCOPYRROLATE 0.2 MG: 0.2 INJECTION, SOLUTION INTRAMUSCULAR; INTRAVENOUS at 11:45:00

## 2022-10-26 RX ADMIN — SODIUM CHLORIDE, SODIUM LACTATE, POTASSIUM CHLORIDE, CALCIUM CHLORIDE: 600; 310; 30; 20 INJECTION, SOLUTION INTRAVENOUS at 11:43:00

## 2022-10-26 RX ADMIN — MIDAZOLAM HYDROCHLORIDE 2 MG: 1 INJECTION INTRAMUSCULAR; INTRAVENOUS at 11:43:00

## 2022-10-26 NOTE — BRIEF OP NOTE
Pre-Operative Diagnosis: Age related nuclear cataract right eye     Post-Operative Diagnosis: Age related nuclear cataract right eye      Procedure Performed:   Right eye cataract extraction, intraocular lens insertion    Surgeon(s) and Role:     Dorrie Apgar, MD - Primary    Anesthesia: RBB     Estimated Blood Loss: Blood Output: 0 mL (10/26/2022 46:56 PM)    Complications: None    Mil Castro MD  10/26/2022  12:17 PM

## 2022-10-26 NOTE — OPERATIVE REPORT
[facility]    PATIENT'S NAME: [address] [facility] PHYSICIAN: Nils Gillette MD   OPERATING PHYSICIAN: Nils Gillette MD   PATIENT ACCOUNT#:   [de-identified]    LOCATION:  [address]  MEDICAL RECORD #:   [id number]       YOB: 1955  ADMISSION DATE:       10/26/2022      OPERATION DATE:  10/26/2022    OPERATIVE REPORT      PREOPERATIVE DIAGNOSIS:  Age-related visually significant nuclear sclerotic cataract, right eye. POSTOPERATIVE DIAGNOSIS:  Age-related visually significant nuclear sclerotic cataract, right eye. PROCEDURE:  Cataract extraction with phacoemulsification and placement of posterior chamber intraocular lens. ANESTHESIA:  Retrobulbar block. IMPLANTS:  Jose F SN60WF, 19.5 diopter, intraocular lens serial number [id number]. COMPLICATIONS:  None. INDICATIONS:  The patient presents with complaints of decreased vision in her eye significantly affecting her activities of daily living. Clinical examination is notable for age-related  visually significant nuclear sclerotic cataract in her right eye. After a thorough discussion of the risks, benefits and alternatives to cataract surgery was carried out, the patient elected to proceed with this procedure. OPERATIVE TECHNIQUE:  After proper informed consent was obtained, the patient was transferred to the operating room. The right eye was then prepped and draped in the usual sterile ophthalmic fashion for surgery. Lid speculum was placed. The conjunctival surface was irrigated and swabbed with dilute Betadine, followed by balanced salt solution. A 1 mm blade was used to fashion an supratemporal paracentesis site, through which viscoelastic agent was used to fill the anterior chamber. A 2.5 mm keratome blade was used to fashion a temporal clear corneal incision, through which a cystotome needle was introduced, and used to initiate a capsulorrhexis, which was then completed using Utrata forceps.   Balanced salt solution was injected under the lip of the anterior capsule, using a fluid tip needle to create a nice fluid wave with hydrodissection and hydrodelineation. The phacoemulsification probe was used to remove the nuclear lens material.  The cortical lens material was removed using the irrigation/aspiration probe. Viscoelastic agent was used to fill the capsular bag. The Jose F SN60WF intraocular lens was injected into the capsular bag and manipulated such that both haptics were secured within the capsular bag using a Sinskey hook. Irrigation/aspiration was used to remove any remaining viscoelastic agent. Balanced salt solution was used to hydrate the wounds and fill the anterior chamber to an appropriate depth and pressure. Subconjunctival ciprofloxacin and dexamethasone were then administered. Speculum and drapes were removed. Operative area was cleaned and dressed with a patch. The patient was transferred to the PACU in stable condition, tolerated the procedure well without any complication.      Dictated By Milady Reyes MD  d: 10/26/2022 12:22:46  t: 10/26/2022 18:42:50  [facility] [id number]  /

## 2022-10-26 NOTE — ANESTHESIA POSTPROCEDURE EVALUATION
Patient: Carmina Douglas    Procedure Summary     Date: 10/26/22 Room / Location: [address] [place] [address] MAIN OR 03 / [address] [place] [address] MAIN OR    Anesthesia Start: 2458 Anesthesia Stop: 1210    Procedure: Right eye cataract extraction, intraocular lens insertion (Right: Eye) Diagnosis: (Age related nuclear cataract right eye)    Surgeons: Neela Harrell MD Anesthesiologist: Ethan Henriquez MD    Anesthesia Type: MAC ASA Status: 3          Anesthesia Type: MAC    Vitals Value Taken Time   /65 10/26/22 1220   Temp 98.0F 10/26/22 1224   Pulse 60 10/26/22 1224   Resp 15 10/26/22 1224   SpO2 98 % 10/26/22 1224   Vitals shown include unvalidated device data.     [address] [place] [address] AN Post Evaluation:   Patient Evaluated in PACU  Patient Participation: complete - patient participated  Level of Consciousness: awake and alert  Pain Management: satisfactory to patient  Airway Patency:patent  Dental exam unchanged from preop  Yes    Cardiovascular Status: hemodynamically stable  Respiratory Status: nonlabored ventilation and spontaneous ventilation  Postoperative Hydration balanced      Alejandro Lopez MD  10/26/2022 12:24 PM

## 2022-10-26 NOTE — H&P
History & Physical Examination    Name: Nadege Darling  Patient ID:  [id number]  Date:  10/26/2022  YOB: 1955 AGE:  78year old SEX:  female      M.D./D.O./D.P.M PERFORMING SURGERY/PROCEDURE:    Pricilla Cueto MD    Diagnosis:  Age related nuclear cataract right eye    Procedure: Right eye cataract extraction, intraocular lens insertion    Anesthesia Type: MAC    Allergies:   Radiology Contrast *    HIVES  Sulfa Antibiotics       HIVES  Penicillins             RASH    Medications:  No current outpatient medications on file. Medical  History:  has a past medical history of Arrhythmia (bradicardia), Cataract, Deep vein thrombosis (HCC) (lt arm 2020), Depression, Diabetes ([address] High blood pressure, High cholesterol, Sleep apnea (no machine), and Visual impairment (glasses). She has no past medical history of Anesthesia complication, Difficult intubation, Family history of malignant hyperthermia, Family history of pseudocholinesterase deficiency, History of adverse reaction to anesthesia, History of blood transfusion, motion sickness, Malignant hyperthermia, PONV (postoperative nausea and vomiting), Pseudocholinesterase deficiency, or Pulmonary embolism ([address] Surgical History:  has a past surgical history that includes Cardiac pacemaker placement (2020). Family History: family history is not on file. Physical Exam:     System Review is Normal Exam is Normal If not normal, explain   HEENT no no Dec va with cataract od   NECK & BACK yes yes    HEART yes yes    LUNGS yes yes    ABDOMEN yes yes    UROGENITAL yes yes    EXTREMITIES yes yes    MENTAL STATUS yes yes        Impression: cataract od    Plan:  Phaco/iol od    I have discussed the risks and benefits and alternatives with the patient/family. They understand and agree to proceed with the plan of care. Yes    Interval Note:    I have reviewed the History and Physical done within the last 30 days. Any changes are noted above. Carmina West MD 10/26/22

## 2023-03-20 RX ORDER — LINACLOTIDE 290 UG/1
1 CAPSULE, GELATIN COATED ORAL EVERY MORNING
COMMUNITY
Start: 2023-03-05

## 2023-03-20 RX ORDER — HYDRALAZINE HYDROCHLORIDE 50 MG/1
1 TABLET, FILM COATED ORAL 3 TIMES DAILY
COMMUNITY
Start: 2023-02-28

## 2023-03-20 RX ORDER — ASPIRIN 81 MG/1
81 TABLET ORAL DAILY
COMMUNITY
Start: 2022-11-15

## 2023-03-20 RX ORDER — AMLODIPINE BESYLATE AND BENAZEPRIL HYDROCHLORIDE 10; 20 MG/1; MG/1
1 CAPSULE ORAL
COMMUNITY
Start: 2023-03-07

## 2023-03-20 RX ORDER — OLOPATADINE HYDROCHLORIDE 1 MG/ML
1 SOLUTION/ DROPS OPHTHALMIC DAILY
COMMUNITY
Start: 2023-03-03

## 2023-03-20 RX ORDER — CHOLECALCIFEROL (VITAMIN D3) 1250 MCG
1 CAPSULE ORAL WEEKLY
COMMUNITY
Start: 2023-03-13

## 2023-03-20 RX ORDER — AMOXICILLIN 250 MG
2 CAPSULE ORAL NIGHTLY
COMMUNITY
Start: 2021-07-13 | End: 2023-03-21

## 2023-03-20 RX ORDER — PANTOPRAZOLE SODIUM 40 MG/1
1 TABLET, DELAYED RELEASE ORAL EVERY MORNING
COMMUNITY
Start: 2023-01-27 | End: 2023-03-21

## 2023-03-20 NOTE — PAT NURSING NOTE
Spoke with Melinda RN at [facility] as nurse for patient. Requested medication list, labs, EKG and notes from PCP visit on 3/18/23 to be faxed to PAT. Reviewed surgery instructions and will fax to the facility at [phone]. Patient will be transported to hospital by [facility] rehab, will verify transportation with facility on 3/21/23.

## 2023-03-21 RX ORDER — FLUOROMETHOLONE ACETATE 1 MG/ML
1 SUSPENSION/ DROPS OPHTHALMIC 2 TIMES DAILY
COMMUNITY

## 2023-03-21 RX ORDER — LORATADINE 10 MG/1
10 TABLET ORAL DAILY
COMMUNITY

## 2023-03-21 RX ORDER — ATORVASTATIN CALCIUM 10 MG/1
10 TABLET, FILM COATED ORAL NIGHTLY
COMMUNITY
Start: 2023-03-13

## 2023-03-21 RX ORDER — PSEUDOEPHEDRINE HCL 30 MG
1 TABLET ORAL 3 TIMES DAILY
COMMUNITY
Start: 2022-02-14

## 2023-03-22 ENCOUNTER — ANESTHESIA EVENT (OUTPATIENT)
Dept: SURGERY | Facility: HOSPITAL | Age: 68
End: 2023-03-22
Payer: MEDICARE

## 2023-03-22 ENCOUNTER — HOSPITAL ENCOUNTER (OUTPATIENT)
Facility: HOSPITAL | Age: 68
Setting detail: HOSPITAL OUTPATIENT SURGERY
Discharge: NURSING, REHAB OR LONG-TERM CARE | End: 2023-03-22
Attending: OPHTHALMOLOGY | Admitting: OPHTHALMOLOGY
Payer: MEDICARE

## 2023-03-22 ENCOUNTER — ANESTHESIA (OUTPATIENT)
Dept: SURGERY | Facility: HOSPITAL | Age: 68
End: 2023-03-22
Payer: MEDICARE

## 2023-03-22 VITALS
TEMPERATURE: 97 F | RESPIRATION RATE: 12 BRPM | WEIGHT: 235 LBS | SYSTOLIC BLOOD PRESSURE: 156 MMHG | HEIGHT: 66 IN | DIASTOLIC BLOOD PRESSURE: 62 MMHG | BODY MASS INDEX: 37.77 KG/M2 | HEART RATE: 60 BPM | OXYGEN SATURATION: 96 %

## 2023-03-22 DIAGNOSIS — Z01.818 PRE-OP TESTING: Primary | ICD-10-CM

## 2023-03-22 LAB
GLUCOSE BLDC GLUCOMTR-MCNC: 162 MG/DL (ref 70–99)
GLUCOSE BLDC GLUCOMTR-MCNC: 169 MG/DL (ref 70–99)

## 2023-03-22 PROCEDURE — 82962 GLUCOSE BLOOD TEST: CPT

## 2023-03-22 PROCEDURE — 08T53ZZ RESECTION OF LEFT VITREOUS, PERCUTANEOUS APPROACH: ICD-10-PCS | Performed by: OPHTHALMOLOGY

## 2023-03-22 PROCEDURE — 08QF3ZZ REPAIR LEFT RETINA, PERCUTANEOUS APPROACH: ICD-10-PCS | Performed by: OPHTHALMOLOGY

## 2023-03-22 RX ORDER — KETOROLAC TROMETHAMINE 5 MG/ML
1 SOLUTION OPHTHALMIC SEE ADMIN INSTRUCTIONS
Status: COMPLETED | OUTPATIENT
Start: 2023-03-22 | End: 2023-03-22

## 2023-03-22 RX ORDER — BALANCED SALT SOLUTION 6.4; .75; .48; .3; 3.9; 1.7 MG/ML; MG/ML; MG/ML; MG/ML; MG/ML; MG/ML
SOLUTION OPHTHALMIC AS NEEDED
Status: DISCONTINUED | OUTPATIENT
Start: 2023-03-22 | End: 2023-03-22 | Stop reason: HOSPADM

## 2023-03-22 RX ORDER — NICOTINE POLACRILEX 4 MG
30 LOZENGE BUCCAL
Status: DISCONTINUED | OUTPATIENT
Start: 2023-03-22 | End: 2023-03-22 | Stop reason: HOSPADM

## 2023-03-22 RX ORDER — DEXAMETHASONE SODIUM PHOSPHATE 4 MG/ML
VIAL (ML) INJECTION AS NEEDED
Status: DISCONTINUED | OUTPATIENT
Start: 2023-03-22 | End: 2023-03-22 | Stop reason: HOSPADM

## 2023-03-22 RX ORDER — MORPHINE SULFATE 4 MG/ML
2 INJECTION, SOLUTION INTRAMUSCULAR; INTRAVENOUS EVERY 10 MIN PRN
Status: DISCONTINUED | OUTPATIENT
Start: 2023-03-22 | End: 2023-03-22

## 2023-03-22 RX ORDER — HYDROMORPHONE HYDROCHLORIDE 1 MG/ML
0.4 INJECTION, SOLUTION INTRAMUSCULAR; INTRAVENOUS; SUBCUTANEOUS EVERY 5 MIN PRN
Status: DISCONTINUED | OUTPATIENT
Start: 2023-03-22 | End: 2023-03-22

## 2023-03-22 RX ORDER — PHENYLEPHRINE HCL 2.5 %
2 DROPS OPHTHALMIC (EYE) SEE ADMIN INSTRUCTIONS
Status: COMPLETED | OUTPATIENT
Start: 2023-03-22 | End: 2023-03-22

## 2023-03-22 RX ORDER — KETAMINE HYDROCHLORIDE 50 MG/ML
INJECTION, SOLUTION, CONCENTRATE INTRAMUSCULAR; INTRAVENOUS AS NEEDED
Status: DISCONTINUED | OUTPATIENT
Start: 2023-03-22 | End: 2023-03-22 | Stop reason: SURG

## 2023-03-22 RX ORDER — HYDROCODONE BITARTRATE AND ACETAMINOPHEN 5; 325 MG/1; MG/1
1 TABLET ORAL ONCE AS NEEDED
Status: DISCONTINUED | OUTPATIENT
Start: 2023-03-22 | End: 2023-03-22

## 2023-03-22 RX ORDER — CIPROFLOXACIN HYDROCHLORIDE 3.5 MG/ML
SOLUTION/ DROPS TOPICAL AS NEEDED
Status: DISCONTINUED | OUTPATIENT
Start: 2023-03-22 | End: 2023-03-22 | Stop reason: HOSPADM

## 2023-03-22 RX ORDER — NICOTINE POLACRILEX 4 MG
15 LOZENGE BUCCAL
Status: DISCONTINUED | OUTPATIENT
Start: 2023-03-22 | End: 2023-03-22 | Stop reason: HOSPADM

## 2023-03-22 RX ORDER — METOCLOPRAMIDE HYDROCHLORIDE 5 MG/ML
10 INJECTION INTRAMUSCULAR; INTRAVENOUS EVERY 8 HOURS PRN
Status: DISCONTINUED | OUTPATIENT
Start: 2023-03-22 | End: 2023-03-22

## 2023-03-22 RX ORDER — MORPHINE SULFATE 10 MG/ML
6 INJECTION, SOLUTION INTRAMUSCULAR; INTRAVENOUS EVERY 10 MIN PRN
Status: DISCONTINUED | OUTPATIENT
Start: 2023-03-22 | End: 2023-03-22

## 2023-03-22 RX ORDER — MIDAZOLAM HYDROCHLORIDE 1 MG/ML
INJECTION INTRAMUSCULAR; INTRAVENOUS AS NEEDED
Status: DISCONTINUED | OUTPATIENT
Start: 2023-03-22 | End: 2023-03-22 | Stop reason: SURG

## 2023-03-22 RX ORDER — ACETAMINOPHEN 500 MG
1000 TABLET ORAL ONCE AS NEEDED
Status: DISCONTINUED | OUTPATIENT
Start: 2023-03-22 | End: 2023-03-22

## 2023-03-22 RX ORDER — NALOXONE HYDROCHLORIDE 0.4 MG/ML
80 INJECTION, SOLUTION INTRAMUSCULAR; INTRAVENOUS; SUBCUTANEOUS AS NEEDED
Status: DISCONTINUED | OUTPATIENT
Start: 2023-03-22 | End: 2023-03-22

## 2023-03-22 RX ORDER — FAMOTIDINE 20 MG/1
20 TABLET, FILM COATED ORAL ONCE
Status: COMPLETED | OUTPATIENT
Start: 2023-03-22 | End: 2023-03-22

## 2023-03-22 RX ORDER — HYDROMORPHONE HYDROCHLORIDE 1 MG/ML
0.6 INJECTION, SOLUTION INTRAMUSCULAR; INTRAVENOUS; SUBCUTANEOUS EVERY 5 MIN PRN
Status: DISCONTINUED | OUTPATIENT
Start: 2023-03-22 | End: 2023-03-22

## 2023-03-22 RX ORDER — CIPROFLOXACIN HYDROCHLORIDE 3.5 MG/ML
2 SOLUTION/ DROPS TOPICAL SEE ADMIN INSTRUCTIONS
Status: COMPLETED | OUTPATIENT
Start: 2023-03-22 | End: 2023-03-22

## 2023-03-22 RX ORDER — HYDROMORPHONE HYDROCHLORIDE 1 MG/ML
0.2 INJECTION, SOLUTION INTRAMUSCULAR; INTRAVENOUS; SUBCUTANEOUS EVERY 5 MIN PRN
Status: DISCONTINUED | OUTPATIENT
Start: 2023-03-22 | End: 2023-03-22

## 2023-03-22 RX ORDER — NICOTINE POLACRILEX 4 MG
15 LOZENGE BUCCAL
Status: DISCONTINUED | OUTPATIENT
Start: 2023-03-22 | End: 2023-03-22

## 2023-03-22 RX ORDER — SODIUM CHLORIDE, SODIUM LACTATE, POTASSIUM CHLORIDE, CALCIUM CHLORIDE 600; 310; 30; 20 MG/100ML; MG/100ML; MG/100ML; MG/100ML
INJECTION, SOLUTION INTRAVENOUS CONTINUOUS
Status: DISCONTINUED | OUTPATIENT
Start: 2023-03-22 | End: 2023-03-22

## 2023-03-22 RX ORDER — ACETAMINOPHEN 500 MG
1000 TABLET ORAL ONCE
Status: COMPLETED | OUTPATIENT
Start: 2023-03-22 | End: 2023-03-22

## 2023-03-22 RX ORDER — DEXTROSE MONOHYDRATE 25 G/50ML
50 INJECTION, SOLUTION INTRAVENOUS
Status: DISCONTINUED | OUTPATIENT
Start: 2023-03-22 | End: 2023-03-22

## 2023-03-22 RX ORDER — NICOTINE POLACRILEX 4 MG
30 LOZENGE BUCCAL
Status: DISCONTINUED | OUTPATIENT
Start: 2023-03-22 | End: 2023-03-22

## 2023-03-22 RX ORDER — HYDROCODONE BITARTRATE AND ACETAMINOPHEN 5; 325 MG/1; MG/1
2 TABLET ORAL ONCE AS NEEDED
Status: DISCONTINUED | OUTPATIENT
Start: 2023-03-22 | End: 2023-03-22

## 2023-03-22 RX ORDER — DEXTROSE MONOHYDRATE 25 G/50ML
50 INJECTION, SOLUTION INTRAVENOUS
Status: DISCONTINUED | OUTPATIENT
Start: 2023-03-22 | End: 2023-03-22 | Stop reason: HOSPADM

## 2023-03-22 RX ORDER — METOCLOPRAMIDE 10 MG/1
10 TABLET ORAL ONCE
Status: COMPLETED | OUTPATIENT
Start: 2023-03-22 | End: 2023-03-22

## 2023-03-22 RX ORDER — TROPICAMIDE 10 MG/ML
2 SOLUTION/ DROPS OPHTHALMIC SEE ADMIN INSTRUCTIONS
Status: COMPLETED | OUTPATIENT
Start: 2023-03-22 | End: 2023-03-22

## 2023-03-22 RX ORDER — MORPHINE SULFATE 4 MG/ML
4 INJECTION, SOLUTION INTRAMUSCULAR; INTRAVENOUS EVERY 10 MIN PRN
Status: DISCONTINUED | OUTPATIENT
Start: 2023-03-22 | End: 2023-03-22

## 2023-03-22 RX ORDER — ONDANSETRON 2 MG/ML
4 INJECTION INTRAMUSCULAR; INTRAVENOUS EVERY 6 HOURS PRN
Status: DISCONTINUED | OUTPATIENT
Start: 2023-03-22 | End: 2023-03-22

## 2023-03-22 RX ADMIN — MIDAZOLAM HYDROCHLORIDE 2 MG: 1 INJECTION INTRAMUSCULAR; INTRAVENOUS at 10:03:00

## 2023-03-22 RX ADMIN — KETAMINE HYDROCHLORIDE 25 MG: 50 INJECTION, SOLUTION, CONCENTRATE INTRAMUSCULAR; INTRAVENOUS at 10:03:00

## 2023-03-22 NOTE — BRIEF OP NOTE
Pre-Operative Diagnosis: Type 2 diabetes with macular edema     Post-Operative Diagnosis: brvo, vh, dr os     Procedure Performed:   Pars plana vitrectomy, pan retinal photocoagulation, left eye    Surgeon(s) and Role:     Tamra Joe MD - Primary    Anesthesia: RBB     Estimated Blood Loss: None    Complications:  None    Nico Cotter MD  3/22/2023  10:33 AM

## 2023-03-22 NOTE — H&P
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 Medical  History:  has a past medical history of Arrhythmia (bradicardia), Cataract, Deep vein thrombosis (HCC) (lt arm 2020), Depression, Diabetes ([address] Esophageal reflux, High blood pressure, High cholesterol, Sleep apnea (no machine), and Visual impairment (glasses). She has no past medical history of Anesthesia complication, Difficult intubation, Family history of malignant hyperthermia, Family history of pseudocholinesterase deficiency, History of adverse reaction to anesthesia, History of blood transfusion, motion sickness, Malignant hyperthermia, PONV (postoperative nausea and vomiting), Pseudocholinesterase deficiency, or Pulmonary embolism ([address] Surgical History:  has a past surgical history that includes Cardiac pacemaker placement (2020); cataract; and upper gi endoscopy,exam.    Family History: family history includes Diabetes in her father and mother; Heart Disorder in her father and mother.     Physical Exam:     System Review is Normal Exam is Normal If not normal, explain   HEENT no no Dec va with diabetic retinopathy os   NECK & BACK yes yes    HEART yes yes    LUNGS yes yes    ABDOMEN yes yes    UROGENITAL yes yes    EXTREMITIES yes yes    MENTAL STATUS yes yes        Impression:     diabetic retinopathy with macular edema os    Plan:  Ppv/prp os    I have discussed the risks and benefits and alternatives with the patient/family. They understand and agree to proceed with the plan of care. Yes    Interval Note:    I have reviewed the History and Physical done within the last 30 days. Any changes are noted above.      Maximino Elder MD 03/22/23

## 2023-03-23 NOTE — OPERATIVE REPORT
[facility]    PATIENT'S NAME: Gordo Wood   ATTENDING PHYSICIAN: Wesly Smith MD   OPERATING PHYSICIAN: Wesly Smith MD   PATIENT ACCOUNT#:   [de-identified]    LOCATION:  [address]  MEDICAL RECORD #:   [id number]       YOB: 1955  ADMISSION DATE:       03/22/2023      OPERATION DATE:  03/22/2023    OPERATIVE REPORT    PREOPERATIVE DIAGNOSIS:  Type 2 diabetes with diabetic retinopathy, left eye. POSTOPERATIVE DIAGNOSIS:  Branch retinal vein occlusion, vitreous hemorrhage, and proliferative diabetic retinopathy, left eye. PROCEDURE:  Pars plana vitrectomy with panretinal photocoagulation with the endolaser probe, left eye. ANESTHESIA:  Retrobulbar block. IMPLANTS:  None. COMPLICATIONS:  None. DISPOSITION:  Stable, to PACU. INDICATIONS:  The patient presents with complaints of decreased vision in her left eye significantly affecting her activities of daily living. Clinical examination is notable for a vitreous hemorrhage in the left eye. After a thorough discussion of the risks, benefits and alternatives to vitrectomy surgery was carried out, the patient elected to proceed with this procedure. OPERATIVE TECHNIQUE:  After proper informed consent was obtained, the patient was transferred to the operating room. The left eye was then prepped and draped in the usual sterile ophthalmic fashion for surgery. Lid speculum was placed. The conjunctival surface was irrigated and swabbed with dilute Betadine, followed by balanced salt solution. The 25-gauge vitrectomy system was used. Trocar was used to place cannulas 3 mm posterior to the limbus in the inferotemporal, superotemporal, and superonasal quadrants with the inferotemporal cannula placed first and hooked to the infusion line, followed by placement of the remaining cannulas.   Core vitrectomy was carried out, followed by the removal of the peripheral vitreous skirt as well as the anterior vitreous and posterior capsule. Endolaser was applied in the inferior 180 degrees periphery of the retina. The cannulas were then removed sequentially with the inferotemporal cannula removed last along with the infusion line. Subconjunctival moxifloxacin and dexamethasone were then administered. Speculum and drapes removed. Operative area was cleaned and dressed with a patch. The patient was transferred to the PACU in stable condition, tolerated the procedure well without any complication.     Dictated By Elías Trimble MD  d: 03/22/2023 10:35:11  t: 03/22/2023 21:36:52  [facility] [id number]  /

## 2023-09-06 ENCOUNTER — APPOINTMENT (OUTPATIENT)
Dept: GENERAL RADIOLOGY | Facility: HOSPITAL | Age: 68
End: 2023-09-06
Attending: EMERGENCY MEDICINE
Payer: MEDICARE

## 2023-09-06 ENCOUNTER — HOSPITAL ENCOUNTER (EMERGENCY)
Facility: HOSPITAL | Age: 68
Discharge: HOME | End: 2023-09-06
Attending: EMERGENCY MEDICINE
Payer: MEDICARE

## 2023-09-06 VITALS
OXYGEN SATURATION: 98 % | RESPIRATION RATE: 16 BRPM | SYSTOLIC BLOOD PRESSURE: 150 MMHG | WEIGHT: 240 LBS | HEART RATE: 60 BPM | BODY MASS INDEX: 38.57 KG/M2 | DIASTOLIC BLOOD PRESSURE: 68 MMHG | TEMPERATURE: 98 F | HEIGHT: 66 IN

## 2023-09-06 DIAGNOSIS — R07.89 CHEST PAIN, ATYPICAL: Primary | ICD-10-CM

## 2023-09-06 LAB
ALBUMIN SERPL-MCNC: 3.7 G/DL (ref 3.4–5)
ALBUMIN/GLOB SERPL: 0.8 {RATIO} (ref 1–2)
ALP LIVER SERPL-CCNC: 191 U/L
ALT SERPL-CCNC: 25 U/L
ANION GAP SERPL CALC-SCNC: 6 MMOL/L (ref 0–18)
AST SERPL-CCNC: 13 U/L (ref 15–37)
BASOPHILS # BLD AUTO: 0.01 X10(3) UL (ref 0–0.2)
BASOPHILS NFR BLD AUTO: 0.1 %
BILIRUB SERPL-MCNC: 0.3 MG/DL (ref 0.1–2)
BUN BLD-MCNC: 17 MG/DL (ref 7–18)
BUN/CREAT SERPL: 17.9 (ref 10–20)
CALCIUM BLD-MCNC: 9.4 MG/DL (ref 8.5–10.1)
CHLORIDE SERPL-SCNC: 107 MMOL/L (ref 98–112)
CO2 SERPL-SCNC: 26 MMOL/L (ref 21–32)
CREAT BLD-MCNC: 0.95 MG/DL
DEPRECATED RDW RBC AUTO: 45.1 FL (ref 35.1–46.3)
EGFRCR SERPLBLD CKD-EPI 2021: 66 ML/MIN/1.73M2 (ref 60–?)
EOSINOPHIL # BLD AUTO: 0.03 X10(3) UL (ref 0–0.7)
EOSINOPHIL NFR BLD AUTO: 0.4 %
ERYTHROCYTE [DISTWIDTH] IN BLOOD BY AUTOMATED COUNT: 14.6 % (ref 11–15)
GLOBULIN PLAS-MCNC: 4.6 G/DL (ref 2.8–4.4)
GLUCOSE BLD-MCNC: 142 MG/DL (ref 70–99)
HCT VFR BLD AUTO: 33.7 %
HGB BLD-MCNC: 11.3 G/DL
IMM GRANULOCYTES # BLD AUTO: 0.01 X10(3) UL (ref 0–1)
IMM GRANULOCYTES NFR BLD: 0.1 %
LYMPHOCYTES # BLD AUTO: 2.39 X10(3) UL (ref 1–4)
LYMPHOCYTES NFR BLD AUTO: 34.3 %
MCH RBC QN AUTO: 28.1 PG (ref 26–34)
MCHC RBC AUTO-ENTMCNC: 33.5 G/DL (ref 31–37)
MCV RBC AUTO: 83.8 FL
MONOCYTES # BLD AUTO: 0.43 X10(3) UL (ref 0.1–1)
MONOCYTES NFR BLD AUTO: 6.2 %
NEUTROPHILS # BLD AUTO: 4.1 X10 (3) UL (ref 1.5–7.7)
NEUTROPHILS # BLD AUTO: 4.1 X10(3) UL (ref 1.5–7.7)
NEUTROPHILS NFR BLD AUTO: 58.9 %
OSMOLALITY SERPL CALC.SUM OF ELEC: 292 MOSM/KG (ref 275–295)
PLATELET # BLD AUTO: 270 10(3)UL (ref 150–450)
POTASSIUM SERPL-SCNC: 4 MMOL/L (ref 3.5–5.1)
PROT SERPL-MCNC: 8.3 G/DL (ref 6.4–8.2)
RBC # BLD AUTO: 4.02 X10(6)UL
SODIUM SERPL-SCNC: 139 MMOL/L (ref 136–145)
TROPONIN I HIGH SENSITIVITY: 5 NG/L
TROPONIN I HIGH SENSITIVITY: 6 NG/L
WBC # BLD AUTO: 7 X10(3) UL (ref 4–11)

## 2023-09-06 PROCEDURE — 71045 X-RAY EXAM CHEST 1 VIEW: CPT | Performed by: EMERGENCY MEDICINE

## 2023-09-06 PROCEDURE — 80053 COMPREHEN METABOLIC PANEL: CPT | Performed by: EMERGENCY MEDICINE

## 2023-09-06 PROCEDURE — 93005 ELECTROCARDIOGRAM TRACING: CPT

## 2023-09-06 PROCEDURE — 36415 COLL VENOUS BLD VENIPUNCTURE: CPT

## 2023-09-06 PROCEDURE — 99284 EMERGENCY DEPT VISIT MOD MDM: CPT

## 2023-09-06 PROCEDURE — 93010 ELECTROCARDIOGRAM REPORT: CPT

## 2023-09-06 PROCEDURE — 84484 ASSAY OF TROPONIN QUANT: CPT | Performed by: EMERGENCY MEDICINE

## 2023-09-06 PROCEDURE — 85025 COMPLETE CBC W/AUTO DIFF WBC: CPT | Performed by: EMERGENCY MEDICINE

## 2023-09-06 RX ORDER — HYDROCODONE BITARTRATE AND ACETAMINOPHEN 10; 325 MG/1; MG/1
1 TABLET ORAL ONCE
Status: COMPLETED | OUTPATIENT
Start: 2023-09-06 | End: 2023-09-06

## 2023-09-06 RX ORDER — GABAPENTIN 300 MG/1
300 CAPSULE ORAL ONCE
Status: COMPLETED | OUTPATIENT
Start: 2023-09-06 | End: 2023-09-06

## 2023-09-07 NOTE — ED INITIAL ASSESSMENT (HPI)
Patient arrived to ED from [facility] via EMS for chest pain. patient reports pain is midsternal and radiates down left arm. Pain is described as a tightness and \"feels like my heart is beating in my neck. \" Pain started 30 minutes prior to arrival. EMS stated they obtained EKG that read 1st degree heart block, EMS administered 324 ASA and 0.4 without any relief. Patient now complains of headache. Patient is A/O x 4    Patient changed into gown. Side rails up x2, call light within reach, blanket provided.

## 2023-09-08 LAB
ATRIAL RATE: 61 BPM
ATRIAL RATE: 63 BPM
P AXIS: 62 DEGREES
P AXIS: 78 DEGREES
P-R INTERVAL: 260 MS
P-R INTERVAL: 264 MS
Q-T INTERVAL: 406 MS
Q-T INTERVAL: 434 MS
QRS DURATION: 80 MS
QRS DURATION: 96 MS
QTC CALCULATION (BEZET): 415 MS
QTC CALCULATION (BEZET): 436 MS
R AXIS: 35 DEGREES
R AXIS: 43 DEGREES
T AXIS: 24 DEGREES
T AXIS: 5 DEGREES
VENTRICULAR RATE: 61 BPM
VENTRICULAR RATE: 63 BPM

## 2023-10-05 ENCOUNTER — APPOINTMENT (OUTPATIENT)
Dept: GENERAL RADIOLOGY | Facility: HOSPITAL | Age: 68
End: 2023-10-05
Attending: EMERGENCY MEDICINE

## 2023-10-05 ENCOUNTER — APPOINTMENT (OUTPATIENT)
Dept: NUCLEAR MEDICINE | Facility: HOSPITAL | Age: 68
End: 2023-10-05
Attending: EMERGENCY MEDICINE

## 2023-10-05 ENCOUNTER — HOSPITAL ENCOUNTER (EMERGENCY)
Facility: HOSPITAL | Age: 68
Discharge: HOME | End: 2023-10-05
Attending: EMERGENCY MEDICINE

## 2023-10-05 VITALS
DIASTOLIC BLOOD PRESSURE: 54 MMHG | HEART RATE: 60 BPM | OXYGEN SATURATION: 97 % | SYSTOLIC BLOOD PRESSURE: 127 MMHG | TEMPERATURE: 98 F | RESPIRATION RATE: 14 BRPM

## 2023-10-05 DIAGNOSIS — R07.89 ANTERIOR CHEST WALL PAIN: Primary | ICD-10-CM

## 2023-10-05 LAB
ALBUMIN SERPL-MCNC: 3.4 G/DL (ref 3.4–5)
ALBUMIN/GLOB SERPL: 0.7 {RATIO} (ref 1–2)
ALP LIVER SERPL-CCNC: 175 U/L
ALT SERPL-CCNC: 21 U/L
ANION GAP SERPL CALC-SCNC: 4 MMOL/L (ref 0–18)
AST SERPL-CCNC: 20 U/L (ref 15–37)
BASOPHILS # BLD AUTO: 0.01 X10(3) UL (ref 0–0.2)
BASOPHILS NFR BLD AUTO: 0.1 %
BILIRUB SERPL-MCNC: 0.6 MG/DL (ref 0.1–2)
BUN BLD-MCNC: 16 MG/DL (ref 7–18)
BUN/CREAT SERPL: 14.3 (ref 10–20)
CALCIUM BLD-MCNC: 9.2 MG/DL (ref 8.5–10.1)
CHLORIDE SERPL-SCNC: 101 MMOL/L (ref 98–112)
CO2 SERPL-SCNC: 28 MMOL/L (ref 21–32)
CREAT BLD-MCNC: 1.12 MG/DL
D DIMER PPP FEU-MCNC: 1.5 UG/ML FEU (ref ?–0.66)
DEPRECATED RDW RBC AUTO: 43.9 FL (ref 35.1–46.3)
EGFRCR SERPLBLD CKD-EPI 2021: 54 ML/MIN/1.73M2 (ref 60–?)
EOSINOPHIL # BLD AUTO: 0.02 X10(3) UL (ref 0–0.7)
EOSINOPHIL NFR BLD AUTO: 0.2 %
ERYTHROCYTE [DISTWIDTH] IN BLOOD BY AUTOMATED COUNT: 14.4 % (ref 11–15)
GLOBULIN PLAS-MCNC: 4.9 G/DL (ref 2.8–4.4)
GLUCOSE BLD-MCNC: 271 MG/DL (ref 70–99)
HCT VFR BLD AUTO: 32.2 %
HGB BLD-MCNC: 10.6 G/DL
IMM GRANULOCYTES # BLD AUTO: 0.01 X10(3) UL (ref 0–1)
IMM GRANULOCYTES NFR BLD: 0.1 %
LYMPHOCYTES # BLD AUTO: 1.59 X10(3) UL (ref 1–4)
LYMPHOCYTES NFR BLD AUTO: 19.6 %
MCH RBC QN AUTO: 27.7 PG (ref 26–34)
MCHC RBC AUTO-ENTMCNC: 32.9 G/DL (ref 31–37)
MCV RBC AUTO: 84.1 FL
MONOCYTES # BLD AUTO: 0.48 X10(3) UL (ref 0.1–1)
MONOCYTES NFR BLD AUTO: 5.9 %
NEUTROPHILS # BLD AUTO: 6.02 X10 (3) UL (ref 1.5–7.7)
NEUTROPHILS # BLD AUTO: 6.02 X10(3) UL (ref 1.5–7.7)
NEUTROPHILS NFR BLD AUTO: 74.1 %
OSMOLALITY SERPL CALC.SUM OF ELEC: 287 MOSM/KG (ref 275–295)
PLATELET # BLD AUTO: 259 10(3)UL (ref 150–450)
POTASSIUM SERPL-SCNC: 4.9 MMOL/L (ref 3.5–5.1)
PROT SERPL-MCNC: 8.3 G/DL (ref 6.4–8.2)
RBC # BLD AUTO: 3.83 X10(6)UL
SODIUM SERPL-SCNC: 133 MMOL/L (ref 136–145)
TROPONIN I HIGH SENSITIVITY: 10 NG/L
WBC # BLD AUTO: 8.1 X10(3) UL (ref 4–11)

## 2023-10-05 PROCEDURE — 71045 X-RAY EXAM CHEST 1 VIEW: CPT | Performed by: EMERGENCY MEDICINE

## 2023-10-05 PROCEDURE — 93010 ELECTROCARDIOGRAM REPORT: CPT

## 2023-10-05 PROCEDURE — 99285 EMERGENCY DEPT VISIT HI MDM: CPT

## 2023-10-05 PROCEDURE — 85379 FIBRIN DEGRADATION QUANT: CPT | Performed by: EMERGENCY MEDICINE

## 2023-10-05 PROCEDURE — 96374 THER/PROPH/DIAG INJ IV PUSH: CPT

## 2023-10-05 PROCEDURE — 96375 TX/PRO/DX INJ NEW DRUG ADDON: CPT

## 2023-10-05 PROCEDURE — 93005 ELECTROCARDIOGRAM TRACING: CPT

## 2023-10-05 PROCEDURE — 80053 COMPREHEN METABOLIC PANEL: CPT | Performed by: EMERGENCY MEDICINE

## 2023-10-05 PROCEDURE — 84484 ASSAY OF TROPONIN QUANT: CPT | Performed by: EMERGENCY MEDICINE

## 2023-10-05 PROCEDURE — 78582 LUNG VENTILAT&PERFUS IMAGING: CPT | Performed by: EMERGENCY MEDICINE

## 2023-10-05 PROCEDURE — 85025 COMPLETE CBC W/AUTO DIFF WBC: CPT | Performed by: EMERGENCY MEDICINE

## 2023-10-05 RX ORDER — KETOROLAC TROMETHAMINE 10 MG/1
10 TABLET, FILM COATED ORAL EVERY 6 HOURS PRN
Qty: 20 TABLET | Refills: 0 | Status: SHIPPED | OUTPATIENT
Start: 2023-10-05 | End: 2023-10-10

## 2023-10-05 RX ORDER — METHOCARBAMOL 100 MG/ML
1 INJECTION, SOLUTION INTRAMUSCULAR; INTRAVENOUS ONCE
Status: COMPLETED | OUTPATIENT
Start: 2023-10-05 | End: 2023-10-05

## 2023-10-05 RX ORDER — ONDANSETRON 2 MG/ML
4 INJECTION INTRAMUSCULAR; INTRAVENOUS ONCE
Status: COMPLETED | OUTPATIENT
Start: 2023-10-05 | End: 2023-10-05

## 2023-10-05 RX ORDER — DROPERIDOL 2.5 MG/ML
1.25 INJECTION, SOLUTION INTRAMUSCULAR; INTRAVENOUS ONCE
Status: COMPLETED | OUTPATIENT
Start: 2023-10-05 | End: 2023-10-05

## 2023-10-05 RX ORDER — METHOCARBAMOL 500 MG/1
500 TABLET, FILM COATED ORAL 4 TIMES DAILY PRN
Qty: 20 TABLET | Refills: 0 | Status: SHIPPED | OUTPATIENT
Start: 2023-10-05 | End: 2023-10-10

## 2023-10-05 RX ORDER — KETOROLAC TROMETHAMINE 15 MG/ML
15 INJECTION, SOLUTION INTRAMUSCULAR; INTRAVENOUS ONCE
Status: COMPLETED | OUTPATIENT
Start: 2023-10-05 | End: 2023-10-05

## 2023-10-05 NOTE — ED PROVIDER NOTES
Patient Seen in: [facility] AND [facility] Emergency Department    History   Patient presents with:  Chest Pain Angina    Stated Complaint: chest pain     HPI    41-year-old female past medical history of diabetes, hypertension, dyslipidemia presenting for evaluation of 1 week of pleuritic and reproducible chest wall pain associate with cough and with worsening symptoms upon position changes/inspiration. No fevers or chills, cough. No trauma. No leg swelling or tenderness, no recent travel or immobilization though status post recent eye surgery under general anesthesia without other recent surgeries. EMR reviewed, 3/2023 Eve scan at [facility] without ischemic change and ED evaluation 9/6 nonacute. Taking hydrocodone chronically. Past Medical History:   Diagnosis Date    Arrhythmia bradicardia    Cataract     Deep vein thrombosis (HCC) lt arm 2020    Depression     Diabetes ([address]     Esophageal reflux     High blood pressure     High cholesterol     Sleep apnea no machine    Visual impairment glasses       Past Surgical History:   Procedure Laterality Date    CARDIAC PACEMAKER PLACEMENT  2020    CATARACT      UPPER GI ENDOSCOPY,EXAM         Medications :   atorvastatin 10 MG Oral Tab,  Take 1 tablet (10 mg total) by mouth at bedtime. docusate sodium 100 MG Oral Cap,  Take 1 capsule by mouth 3 (three) times daily. loratadine 10 MG Oral Tab,  Take 1 tablet (10 mg total) by mouth daily. Fluorometholone Acetate (FLAREX) 0.1 % Ophthalmic Suspension,  Place 1 drop into both eyes 2 (two) times daily. For dry eyes   amLODIPine Besy-Benazepril HCl 10-20 MG Oral Cap,  Take 1 capsule by mouth daily with lunch. aspirin 81 MG Oral Tab EC,  Take 1 tablet (81 mg total) by mouth daily. Cholecalciferol (VITAMIN D3) 1.25 MG (85631 UT) Oral Cap,  Take 1 capsule by mouth once a week. Tuesday   hydrALAZINE 50 MG Oral Tab,  Take 1 tablet (50 mg total) by mouth in the morning, at noon, and at bedtime.    LINZESS 290 MCG Oral Cap,  Take 1 capsule (290 mcg total) by mouth every morning. olopatadine 0.1 % Ophthalmic Solution,  Place 1 drop into both eyes daily. For dry eyes   metFORMIN HCl 1000 MG Oral Tab,  Take 1 tablet (1,000 mg total) by mouth 2 (two) times daily with meals. glipiZIDE 5 MG Oral Tab,  Take 8 mg by mouth every morning before breakfast.   HYDROcodone-acetaminophen  MG Oral Tab,  Take 1 tablet by mouth every 4 (four) hours as needed for Pain. Family History   Problem Relation Age of Onset    Heart Disorder Father     Diabetes Father     Heart Disorder Mother     Diabetes Mother        Social History     Socioeconomic History    Marital status: Single   Tobacco Use    Smoking status: Never    Smokeless tobacco: Never   Vaping Use    Vaping Use: Never used   Substance and Sexual Activity    Alcohol use: Never     Comment: Rarely    Drug use: Never       Review of Systems :  Constitutional: As per HPI  Cardiovascular: Positive chest pain. Positive for stated complaint: chest pain  Other systems are as noted in HPI. Constitutional and vital signs reviewed. All other systems reviewed and negative except as noted above. PSFH elements reviewed from today and agreed except as otherwise stated in HPI. Physical Exam     ED Triage Vitals [10/05/23 1015]   /58   Pulse 59   Resp    Temp    Temp src    SpO2 98 %   O2 Device None (Room air)       Current:/53   Pulse 59   SpO2 97%         Physical Exam   Constitutional: No distress. HEENT: MMM. Head: Normocephalic. Eyes: No injection. Neck: Neck supple. Cardiovascular: RRR. Pulmonary/Chest: Effort normal. CTAB. Anterior chest wall tenderness without cutaneous or crepitant change. Abdominal: Soft. Nontender. Musculoskeletal: No gross deformity. Neurological: Alert. Skin: Skin is warm. Psychiatric: Cooperative. Nursing note and vitals reviewed.         ED Course     Labs Reviewed   COMP METABOLIC PANEL (14) - Abnormal; Notable for the following components:       Result Value    Glucose 271 (*)     Sodium 133 (*)     Creatinine 1.12 (*)     eGFR-Cr 54 (*)     Alkaline Phosphatase 175 (*)     Total Protein 8.3 (*)     Globulin  4.9 (*)     A/G Ratio 0.7 (*)     All other components within normal limits   D-DIMER - Abnormal; Notable for the following components:    D-Dimer 1.50 (*)     All other components within normal limits   CBC W/ DIFFERENTIAL - Abnormal; Notable for the following components:    HGB 10.6 (*)     HCT 32.2 (*)     All other components within normal limits   TROPONIN I HIGH SENSITIVITY - Normal   CBC WITH DIFFERENTIAL WITH PLATELET    Narrative: The following orders were created for panel order CBC With Differential With Platelet. Procedure                               Abnormality         Status                     ---------                               -----------         ------                     CBC W/ DIFFERENTIAL[[id number]]          Abnormal            Final result                 Please view results for these tests on the individual orders. EKG    Rate, intervals and axes as noted on EKG Report. Rate: 71  Rhythm: Sinus Rhythm  Reading: NSR 71 without ST elevation as independently interpreted by myself           NM LUNG VQ VENT / PERFUSION SCAN  (MID=[id number])    Result Date: 10/5/2023  PROCEDURE: NM LUNG VQ/PERFUSION SCAN (CRW=10995)  COMPARISON: [facility], XR CHEST AP PORTABLE (CPT=71045), 10/05/2023, 9:14 AM.  INDICATIONS: 64-year-old female with diffuse chest pain aggravated with deep breathing and shortness of breath. TECHNIQUE: Ventilation/perfusion lung study done with 9.5 millicuries CIPSL-169 (inhaled), followed by 8.5 millicuries of [id number] MAA injected into a right antecubital vein. FINDINGS:  PERFUSION: Normal, no segmental or sub-segmental defects. VENTILATION - SINGLE BREATH: Normal homogeneous isotope distribution.   VENTILATION EQUILIBRIUM AND WASHOUT IMAGES: There is likely washout of tracer from the right lung base. V/Q MISMATCH: None significant. Comparison is made with a chest X-ray noted above. CONCLUSION: Normal perfusion. No mismatch. No pulmonary embolus. Ventilation imaging shows right basal air trapping. Dictated by (CST): Phuong Zimmer MD on 10/05/2023 at 11:41 AM     Finalized by (CST): Phuong Zimmer MD on 10/05/2023 at 11:42 AM          XR CHEST AP PORTABLE  (CPT=71045)    Result Date: 10/5/2023  PROCEDURE: XR CHEST AP PORTABLE  (CPT=71045) TIME: 0914 hours  COMPARISON: [facility], XR CHEST AP PORTABLE (CPT=71045), 9/06/2023, 8:13 PM.  INDICATIONS: Chest pain x 5 days. TECHNIQUE:   Single view. FINDINGS:  CARDIAC/VASC: Borderline cardiomegaly. Unremarkable pulmonary vasculature. MEDIAST/TRELL:   Tortuous thoracic aorta Pacemaker left anterior chest with dual-chamber leads in the right atrium and right ventricle. LUNGS/PLEURA: Bibasilar atelectasis has developed. Hypoinflation. BONES: Mild scoliosis with mild degenerative disc disease and spondylosis. OTHER: Negative. CONCLUSION:  1. Borderline cardiomegaly. Tortuous aorta. 2. Bibasilar atelectatic changes have developed. Hypoinflation. Dictated by (CST): Ez Olivarez MD on 10/05/2023 at 9:22 AM     Finalized by (CST): Ez Olivarez MD on 10/05/2023 at 9:24 AM          XR CHEST AP PORTABLE  (CPT=71045)    Result Date: 9/6/2023  PROCEDURE: XR CHEST AP PORTABLE  (CPT=71045) TIME: 2017. COMPARISON: None available. INDICATIONS: Chest wall pain and headache of acute onset. History of diabetes. TECHNIQUE:   Single view. FINDINGS:  DEVICES: A left-sided dual-lead transvenous pacemaker device is present with electrodes in customary position. CARDIAC/VASC: The cardiomediastinal silhouette is not enlarged. There is mild tortuosity of the thoracic aorta with peripheral atherosclerotic vascular calcification. The pulmonary vascularity is within normal limits. MEDIAST/TRELL: No visible mass or adenopathy. LUNGS/PLEURA: No airspace consolidation, pleural effusion, or pneumothorax is evident. BONES: Mild scoliosis and multilevel degenerative changes of the thoracic spine are apparent. There are mild degenerative changes of the right shoulder. OTHER: Leads overlie the chest and obscure underlying detail. CONCLUSION:  Negative for radiographically evident acute intrathoracic process. Dictated by (CST): Josh Cee MD on 9/06/2023 at 8:38 PM     Finalized by (CST): Josh Cee MD on 9/06/2023 at 8:39 PM             Heart Score:        ED Course as of 10/05/23 1441  ------------------------------------------------------------  Time: 10/05 1204  Comment: ED course nonacute, resting/asking for food -        MDM   DIFFERENTIAL DIAGNOSIS: After history and physical exam differential diagnosis includes but is not limited to ACS, myocarditis, anemia, electrolyte derangement, VTE, MSK pain, pneumothorax/hemothorax, pulmonary contusion. Pulse ox: 97% :Normal on RA, as interpreted by myself    Cardiac Monitor Interpretation: Pulse Readings from Last 1 Encounters:  09/06/23 : 60  , sinus,      Medical Decision Making  Evaluation for one week of pleuritic/positional chest pain without exertional complaints with unremarkable provocative evaluation earlier in year and with clearly reproducible pain on examination, similar to prior one month ago. Labs including troponin nonacute aside from elevated dimer for which VQ obtained and without PE. Resting comfortably after meds and asking for meal tray, stable for discharge with symptpmatic care and ongoing outpatient followup. Problems Addressed:  Anterior chest wall pain: acute illness or injury    Amount and/or Complexity of Data Reviewed  External Data Reviewed: labs, radiology, ECG and notes. Details: labs/EKG/CXR and ED notes from 9/6/2023 ED evaluation reviewed  Labs: ordered.  Decision-making details documented in ED Course. Radiology: ordered and independent interpretation performed. Decision-making details documented in ED Course. Details: CXR without obvious pneumothorax as independently interpreted by myself  ECG/medicine tests: ordered and independent interpretation performed. Decision-making details documented in ED Course. Risk  Prescription drug management. I was wearing at minimum a facemask and eye protection throughout this encounter with handwashing performed prior and after patient evaluation without personal hand/facial/oropharyngeal contact and gloves worn throughout encounter. See note and/or contact this provider for further PPE details. Disposition and Plan     Clinical Impression:  Anterior chest wall pain  (primary encounter diagnosis)    Disposition:  Discharge    Follow-up:  Luis Sims MD  [address]  [address]   [phone]    Call  For followup and re-evaluation. Medications Prescribed:  Discharge Medication List as of 10/5/2023  2:55 PM    START taking these medications    methocarbamol 500 MG Oral Tab  Take 1 tablet (500 mg total) by mouth 4 (four) times daily as needed (For spasm/pain. Use caution while taking this medication - it may make you drowsy/dizzy. )., Normal, Disp-20 tablet, R-0    Ketorolac Tromethamine 10 MG Oral Tab  Take 1 tablet (10 mg total) by mouth every 6 (six) hours as needed for Pain., Normal, Disp-20 tablet, R-0

## 2023-10-05 NOTE — ED QUICK NOTES
Pt now states pain is still the same as when she arrived \"Im just sleepy\", son at bedside.   Pt finishing meal ready for discharge now

## 2023-10-05 NOTE — ED INITIAL ASSESSMENT (HPI)
Pt arrived via medics to rm 31 for complaint of chest pain, states pain on both sides of chest, denies injury

## 2023-10-06 LAB
ATRIAL RATE: 71 BPM
P AXIS: -7 DEGREES
P-R INTERVAL: 256 MS
Q-T INTERVAL: 378 MS
QRS DURATION: 86 MS
QTC CALCULATION (BEZET): 410 MS
R AXIS: 10 DEGREES
T AXIS: 14 DEGREES
VENTRICULAR RATE: 71 BPM

## 2024-02-02 ENCOUNTER — HOSPITAL ENCOUNTER (OUTPATIENT)
Dept: LAB | Facility: HOSPITAL | Age: 69
Discharge: HOME | End: 2024-02-02
Attending: NURSE PRACTITIONER
Payer: MEDICARE

## 2024-02-02 LAB
ALBUMIN SERPL-MCNC: 3.7 G/DL (ref 3.4–5)
ALBUMIN/GLOB SERPL: 0.9 {RATIO} (ref 1–2)
ALP LIVER SERPL-CCNC: 262 U/L
ALT SERPL-CCNC: 27 U/L
ANION GAP SERPL CALC-SCNC: 3 MMOL/L (ref 0–18)
AST SERPL-CCNC: 11 U/L (ref 15–37)
BASOPHILS # BLD AUTO: 0.01 X10(3) UL (ref 0–0.2)
BASOPHILS NFR BLD AUTO: 0.2 %
BILIRUB SERPL-MCNC: 0.3 MG/DL (ref 0.1–2)
BUN BLD-MCNC: 17 MG/DL (ref 9–23)
CALCIUM BLD-MCNC: 9.2 MG/DL (ref 8.5–10.1)
CHLORIDE SERPL-SCNC: 106 MMOL/L (ref 98–112)
CHOLEST SERPL-MCNC: 232 MG/DL (ref ?–200)
CO2 SERPL-SCNC: 27 MMOL/L (ref 21–32)
CREAT BLD-MCNC: 1.06 MG/DL
EGFRCR SERPLBLD CKD-EPI 2021: 58 ML/MIN/1.73M2 (ref 60–?)
EOSINOPHIL # BLD AUTO: 0.03 X10(3) UL (ref 0–0.7)
EOSINOPHIL NFR BLD AUTO: 0.6 %
ERYTHROCYTE [DISTWIDTH] IN BLOOD BY AUTOMATED COUNT: 13.8 %
FASTING PATIENT LIPID ANSWER: YES
FASTING STATUS PATIENT QL REPORTED: YES
GLOBULIN PLAS-MCNC: 4.1 G/DL (ref 2.8–4.4)
GLUCOSE BLD-MCNC: 337 MG/DL (ref 70–99)
HCT VFR BLD AUTO: 34.5 %
HDLC SERPL-MCNC: 77 MG/DL (ref 40–59)
HGB BLD-MCNC: 11.8 G/DL
IMM GRANULOCYTES # BLD AUTO: 0.01 X10(3) UL (ref 0–1)
IMM GRANULOCYTES NFR BLD: 0.2 %
IRON SATN MFR SERPL: 22 %
IRON SERPL-MCNC: 79 UG/DL
LDLC SERPL CALC-MCNC: 142 MG/DL (ref ?–100)
LYMPHOCYTES # BLD AUTO: 1.92 X10(3) UL (ref 1–4)
LYMPHOCYTES NFR BLD AUTO: 36.2 %
MCH RBC QN AUTO: 28.4 PG (ref 26–34)
MCHC RBC AUTO-ENTMCNC: 34.2 G/DL (ref 31–37)
MCV RBC AUTO: 83.1 FL
MONOCYTES # BLD AUTO: 0.25 X10(3) UL (ref 0.1–1)
MONOCYTES NFR BLD AUTO: 4.7 %
NEUTROPHILS # BLD AUTO: 3.09 X10 (3) UL (ref 1.5–7.7)
NEUTROPHILS # BLD AUTO: 3.09 X10(3) UL (ref 1.5–7.7)
NEUTROPHILS NFR BLD AUTO: 58.1 %
NONHDLC SERPL-MCNC: 155 MG/DL (ref ?–130)
NT-PROBNP SERPL-MCNC: 56 PG/ML (ref ?–125)
OSMOLALITY SERPL CALC.SUM OF ELEC: 297 MOSM/KG (ref 275–295)
PLATELET # BLD AUTO: 265 10(3)UL (ref 150–450)
POTASSIUM SERPL-SCNC: 4.1 MMOL/L (ref 3.5–5.1)
PROT SERPL-MCNC: 7.8 G/DL (ref 6.4–8.2)
RBC # BLD AUTO: 4.15 X10(6)UL
SODIUM SERPL-SCNC: 136 MMOL/L (ref 136–145)
T4 FREE SERPL-MCNC: 1 NG/DL (ref 0.8–1.7)
TIBC SERPL-MCNC: 359 UG/DL (ref 240–450)
TRANSFERRIN SERPL-MCNC: 241 MG/DL (ref 200–360)
TRIGL SERPL-MCNC: 74 MG/DL (ref 30–149)
TSI SER-ACNC: 1.3 MIU/ML (ref 0.36–3.74)
VLDLC SERPL CALC-MCNC: 14 MG/DL (ref 0–30)
WBC # BLD AUTO: 5.3 X10(3) UL (ref 4–11)

## 2024-02-02 PROCEDURE — 36415 COLL VENOUS BLD VENIPUNCTURE: CPT | Performed by: NURSE PRACTITIONER

## 2024-02-02 PROCEDURE — 80061 LIPID PANEL: CPT | Performed by: NURSE PRACTITIONER

## 2024-02-02 PROCEDURE — 84443 ASSAY THYROID STIM HORMONE: CPT | Performed by: NURSE PRACTITIONER

## 2024-02-02 PROCEDURE — 83880 ASSAY OF NATRIURETIC PEPTIDE: CPT | Performed by: NURSE PRACTITIONER

## 2024-02-02 PROCEDURE — 83540 ASSAY OF IRON: CPT | Performed by: NURSE PRACTITIONER

## 2024-02-02 PROCEDURE — 84439 ASSAY OF FREE THYROXINE: CPT | Performed by: NURSE PRACTITIONER

## 2024-02-02 PROCEDURE — 85025 COMPLETE CBC W/AUTO DIFF WBC: CPT | Performed by: NURSE PRACTITIONER

## 2024-02-02 PROCEDURE — 83550 IRON BINDING TEST: CPT | Performed by: NURSE PRACTITIONER

## 2024-02-02 PROCEDURE — 80053 COMPREHEN METABOLIC PANEL: CPT | Performed by: NURSE PRACTITIONER

## 2024-09-17 ENCOUNTER — HOSPITAL ENCOUNTER (OUTPATIENT)
Dept: LAB | Facility: HOSPITAL | Age: 69
Discharge: HOME | End: 2024-09-17
Attending: INTERNAL MEDICINE
Payer: MEDICARE

## 2024-09-17 LAB
ANION GAP SERPL CALC-SCNC: 8 MMOL/L (ref 0–18)
BASOPHILS # BLD AUTO: 0.02 X10(3) UL (ref 0–0.2)
BASOPHILS NFR BLD AUTO: 0.4 %
BUN BLD-MCNC: 20 MG/DL (ref 9–23)
CALCIUM BLD-MCNC: 10.3 MG/DL (ref 8.7–10.4)
CHLORIDE SERPL-SCNC: 103 MMOL/L (ref 98–112)
CO2 SERPL-SCNC: 26 MMOL/L (ref 21–32)
CREAT BLD-MCNC: 0.94 MG/DL
EGFRCR SERPLBLD CKD-EPI 2021: 67 ML/MIN/1.73M2 (ref 60–?)
EOSINOPHIL # BLD AUTO: 0.1 X10(3) UL (ref 0–0.7)
EOSINOPHIL NFR BLD AUTO: 1.9 %
ERYTHROCYTE [DISTWIDTH] IN BLOOD BY AUTOMATED COUNT: 14.9 %
GLUCOSE BLD-MCNC: 136 MG/DL (ref 70–99)
HCT VFR BLD AUTO: 33 %
HGB BLD-MCNC: 11 G/DL
IMM GRANULOCYTES # BLD AUTO: 0.01 X10(3) UL (ref 0–1)
IMM GRANULOCYTES NFR BLD: 0.2 %
LYMPHOCYTES # BLD AUTO: 2.01 X10(3) UL (ref 1–4)
LYMPHOCYTES NFR BLD AUTO: 37.4 %
MCH RBC QN AUTO: 27.9 PG (ref 26–34)
MCHC RBC AUTO-ENTMCNC: 33.3 G/DL (ref 31–37)
MCV RBC AUTO: 83.8 FL
MONOCYTES # BLD AUTO: 0.34 X10(3) UL (ref 0.1–1)
MONOCYTES NFR BLD AUTO: 6.3 %
NEUTROPHILS # BLD AUTO: 2.9 X10 (3) UL (ref 1.5–7.7)
NEUTROPHILS # BLD AUTO: 2.9 X10(3) UL (ref 1.5–7.7)
NEUTROPHILS NFR BLD AUTO: 53.8 %
OSMOLALITY SERPL CALC.SUM OF ELEC: 289 MOSM/KG (ref 275–295)
PLATELET # BLD AUTO: 297 10(3)UL (ref 150–450)
POTASSIUM SERPL-SCNC: 4.2 MMOL/L (ref 3.5–5.1)
RBC # BLD AUTO: 3.94 X10(6)UL
SODIUM SERPL-SCNC: 137 MMOL/L (ref 136–145)
WBC # BLD AUTO: 5.4 X10(3) UL (ref 4–11)

## 2024-09-17 PROCEDURE — 80048 BASIC METABOLIC PNL TOTAL CA: CPT | Performed by: INTERNAL MEDICINE

## 2024-09-17 PROCEDURE — 36415 COLL VENOUS BLD VENIPUNCTURE: CPT | Performed by: INTERNAL MEDICINE

## 2024-09-17 PROCEDURE — 85025 COMPLETE CBC W/AUTO DIFF WBC: CPT | Performed by: INTERNAL MEDICINE

## 2024-09-17 RX ORDER — METOPROLOL SUCCINATE 50 MG/1
50 TABLET, EXTENDED RELEASE ORAL 2 TIMES DAILY
COMMUNITY

## 2024-09-17 RX ORDER — ROSUVASTATIN CALCIUM 20 MG/1
20 TABLET, COATED ORAL NIGHTLY
COMMUNITY
End: 2024-09-17

## 2024-09-17 NOTE — PAT NURSING NOTE
Pre Procedure Instructions for SVT RFA Dr. Timmons 9/23    [place] at [facility] registration desk, park in [facility]. Need responsible adult  present. NPO at midnight night before, sips of water in am with medications. Hold vitamins and supplements. Hold metoprolol for 5 days and Eliquis for 48 hours prior to procedure, last dose metoprolol 9/17, last dose eliquis 9/21 am. Last dose of metformin 9/22 am, hold 24 hours prior to procedure. Last dose of amlodipine/lotrel on 9/22 am. Hold diabetics medications morning of procedure (metformin and glipizide). Hold ozempic 1 week prior to procedure, last dose 9/16. Shower with antibacterial soap prior. TOA call business day prior after 3:00 pm. Patient verbalized understanding of instructions.

## 2024-09-22 ENCOUNTER — ANESTHESIA EVENT (OUTPATIENT)
Dept: INTERVENTIONAL RADIOLOGY/VASCULAR | Facility: HOSPITAL | Age: 69
End: 2024-09-22
Payer: MEDICARE

## 2024-09-22 NOTE — ANESTHESIA PREPROCEDURE EVALUATION
PRE-OP EVALUATION    Patient Name: Nadege Cruz    Admit Diagnosis: SVT (supraventricular tachycardia) ([facility]) [I47.10]    Pre-op Diagnosis: * No surgery found *        Anesthesia Procedure: CATH EP Radiofrequency/cryo ablation of supraventricular tachycardia.    * Surgery not found *    Pre-op vitals reviewed.  Temp: 96.9 °F (36.1 °C)  Pulse: 68  Resp: 12  BP: 191/81  SpO2: 100 %  Body mass index is 36.32 kg/m².    Current medications reviewed.  Hospital Medications:   chlorhexidine (Hibiclens) 4 % external liquid   Topical On Call    [COMPLETED] sodium chloride 0.9% infusion   Intravenous On Call    [COMPLETED] heparin (Porcine) 5000 UNIT/ML injection        [COMPLETED] lidocaine PF (Xylocaine-MPF) 1 % injection        [COMPLETED] heparin (Porcine) 1000 UNIT/ML injection           Outpatient Medications:     Medications Prior to Admission   Medication Sig Dispense Refill Last Dose    apixaban 5 MG Oral Tab Take 1 tablet (5 mg total) by mouth 2 (two) times daily.   9/19/2024    metoprolol succinate ER 50 MG Oral Tablet 24 Hr Take 1 tablet (50 mg total) by mouth in the morning and 1 tablet (50 mg total) before bedtime.   Past Week    semaglutide 8 MG/3ML Subcutaneous Solution Pen-injector Inject 2 mg into the skin once a week. Takes on Mondays, last dose 9/16   Past Week    atorvastatin 10 MG Oral Tab Take 1 tablet (10 mg total) by mouth at bedtime.   Past Week    docusate sodium 100 MG Oral Cap Take 1 capsule by mouth daily as needed.   Past Week    Fluorometholone Acetate (FLAREX) 0.1 % Ophthalmic Suspension Place 1 drop into both eyes 2 (two) times daily. For dry eyes   Past Week    amLODIPine Besy-Benazepril HCl 10-20 MG Oral Cap Take 1 capsule by mouth daily with lunch.   Past Week    aspirin 81 MG Oral Tab EC Take 1 tablet (81 mg total) by mouth daily.   Past Week    Cholecalciferol (VITAMIN D3) 1.25 MG (32264 UT) Oral Cap Take 1 capsule by mouth once a week. Tuesday   Past Week    LINZESS 290 MCG Oral Cap  Take 1 capsule (290 mcg total) by mouth every morning.   Past Week    olopatadine 0.1 % Ophthalmic Solution Place 1 drop into both eyes daily. For dry eyes   Past Week    metFORMIN HCl 1000 MG Oral Tab Take 1 tablet (1,000 mg total) by mouth 2 (two) times daily with meals.   Past Week    glipiZIDE 5 MG Oral Tab Take 8 mg by mouth every morning before breakfast.   Past Week    HYDROcodone-acetaminophen  MG Oral Tab Take 1 tablet by mouth every 4 (four) hours as needed for Pain.   Past Week    hydrALAZINE 50 MG Oral Tab Take 1 tablet (50 mg total) by mouth in the morning, at noon, and at bedtime. (Patient not taking: Reported on 9/17/2024)   Not Taking       Allergies: Radiology contrast iodinated dyes, Sulfa antibiotics, Vancomycin, Corticosteroids, and Penicillins      Anesthesia Evaluation    Patient summary reviewed.    Anesthetic Complications  (-) history of anesthetic complications         GI/Hepatic/Renal      (+) GERD                           Cardiovascular  Comment: Pacemaker in place.  Has had runs of  SVT   now for EP study and SVT ablation    ECG reviewed.          (+) obesity  (+) hypertension   (+) hyperlipidemia        (+) pacemaker/AICD       (+) dysrhythmias and SVT                  Endo/Other  Comment: On ozempic and metformin and glypizide   Has not taken Ozempic for 2 weeks.    (+) diabetes  type 2,                          Pulmonary                    (+) sleep apnea and CPAP and noncompliant      Neuro/Psych      (+) depression                                Past Surgical History:   Procedure Laterality Date    Cardiac pacemaker placement  2020    Cataract      Upper gi endoscopy,exam       Social History     Socioeconomic History    Marital status: Single   Tobacco Use    Smoking status: Never    Smokeless tobacco: Never   Vaping Use    Vaping status: Never Used   Substance and Sexual Activity    Alcohol use: Never     Comment: Rarely    Drug use: Never     History   Drug Use Unknown      Available pre-op labs reviewed.  Lab Results   Component Value Date    WBC 5.4 09/17/2024    RBC 3.94 09/17/2024    HGB 11.0 (L) 09/17/2024    HCT 33.0 (L) 09/17/2024    MCV 83.8 09/17/2024    MCH 27.9 09/17/2024    MCHC 33.3 09/17/2024    RDW 14.9 09/17/2024    .0 09/17/2024     Lab Results   Component Value Date     09/17/2024    K 4.2 09/17/2024     09/17/2024    CO2 26.0 09/17/2024    BUN 20 09/17/2024    CREATSERUM 0.94 09/17/2024     (H) 09/17/2024    CA 10.3 09/17/2024            Airway      Mallampati: II  Mouth opening: >3 FB  TM distance: > 6 cm  Neck ROM: full Cardiovascular    Cardiovascular exam normal.  Rhythm: regular  Rate: normal  (-) murmur   Dental             Pulmonary    Pulmonary exam normal.  Breath sounds clear to auscultation bilaterally.               Other findings              ASA: 2   Plan: general  NPO status verified and patient meets guidelines.        Comment: GA with OETT/LMA explained to patient. Risks/benefits explained including but not limited to sore throat, hoarse voice, nausea, dental trauma, eye injury,pulmonary complication and other complications as discussed in anesthesia consent form. Need for arterial line discussed.  Agrees to proceed.Patient agrees to proceed. Anesthesia consent signed.     Plan/risks discussed with: patient                Present on Admission:  **None**

## 2024-09-23 ENCOUNTER — ANESTHESIA (OUTPATIENT)
Dept: INTERVENTIONAL RADIOLOGY/VASCULAR | Facility: HOSPITAL | Age: 69
End: 2024-09-23
Payer: MEDICARE

## 2024-09-23 ENCOUNTER — HOSPITAL ENCOUNTER (OUTPATIENT)
Dept: INTERVENTIONAL RADIOLOGY/VASCULAR | Facility: HOSPITAL | Age: 69
Discharge: HOME | End: 2024-09-23
Attending: INTERNAL MEDICINE | Admitting: INTERNAL MEDICINE
Payer: MEDICARE

## 2024-09-23 VITALS
RESPIRATION RATE: 17 BRPM | BODY MASS INDEX: 36.16 KG/M2 | TEMPERATURE: 97 F | HEART RATE: 62 BPM | SYSTOLIC BLOOD PRESSURE: 123 MMHG | HEIGHT: 66 IN | DIASTOLIC BLOOD PRESSURE: 44 MMHG | WEIGHT: 225 LBS | OXYGEN SATURATION: 100 %

## 2024-09-23 DIAGNOSIS — I47.10 SVT (SUPRAVENTRICULAR TACHYCARDIA) (HCC): ICD-10-CM

## 2024-09-23 LAB
ATRIAL RATE: 60 BPM
GLUCOSE BLD-MCNC: 145 MG/DL (ref 70–99)
GLUCOSE BLD-MCNC: 182 MG/DL (ref 70–99)
P AXIS: 24 DEGREES
P-R INTERVAL: 260 MS
Q-T INTERVAL: 436 MS
QRS DURATION: 98 MS
QTC CALCULATION (BEZET): 436 MS
R AXIS: 32 DEGREES
T AXIS: 20 DEGREES
VENTRICULAR RATE: 60 BPM

## 2024-09-23 PROCEDURE — 93653 COMPRE EP EVAL TX SVT: CPT | Performed by: INTERNAL MEDICINE

## 2024-09-23 PROCEDURE — 4A023FZ MEASUREMENT OF CARDIAC RHYTHM, PERCUTANEOUS APPROACH: ICD-10-PCS | Performed by: INTERNAL MEDICINE

## 2024-09-23 PROCEDURE — 02583ZZ DESTRUCTION OF CONDUCTION MECHANISM, PERCUTANEOUS APPROACH: ICD-10-PCS | Performed by: INTERNAL MEDICINE

## 2024-09-23 PROCEDURE — 93005 ELECTROCARDIOGRAM TRACING: CPT

## 2024-09-23 PROCEDURE — 4A0234Z MEASUREMENT OF CARDIAC ELECTRICAL ACTIVITY, PERCUTANEOUS APPROACH: ICD-10-PCS | Performed by: INTERNAL MEDICINE

## 2024-09-23 PROCEDURE — 93010 ELECTROCARDIOGRAM REPORT: CPT | Performed by: INTERNAL MEDICINE

## 2024-09-23 PROCEDURE — 02K83ZZ MAP CONDUCTION MECHANISM, PERCUTANEOUS APPROACH: ICD-10-PCS | Performed by: INTERNAL MEDICINE

## 2024-09-23 PROCEDURE — 82962 GLUCOSE BLOOD TEST: CPT

## 2024-09-23 RX ORDER — DEXTROSE MONOHYDRATE 25 G/50ML
50 INJECTION, SOLUTION INTRAVENOUS
Status: DISCONTINUED | OUTPATIENT
Start: 2024-09-23 | End: 2024-09-23 | Stop reason: HOSPADM

## 2024-09-23 RX ORDER — INSULIN ASPART 100 [IU]/ML
INJECTION, SOLUTION INTRAVENOUS; SUBCUTANEOUS ONCE
Status: DISCONTINUED | OUTPATIENT
Start: 2024-09-23 | End: 2024-09-23 | Stop reason: HOSPADM

## 2024-09-23 RX ORDER — NICOTINE POLACRILEX 4 MG
15 LOZENGE BUCCAL
Status: DISCONTINUED | OUTPATIENT
Start: 2024-09-23 | End: 2024-09-23 | Stop reason: HOSPADM

## 2024-09-23 RX ORDER — NALOXONE HYDROCHLORIDE 0.4 MG/ML
0.08 INJECTION, SOLUTION INTRAMUSCULAR; INTRAVENOUS; SUBCUTANEOUS AS NEEDED
Status: DISCONTINUED | OUTPATIENT
Start: 2024-09-23 | End: 2024-09-23 | Stop reason: HOSPADM

## 2024-09-23 RX ORDER — SODIUM CHLORIDE, SODIUM LACTATE, POTASSIUM CHLORIDE, CALCIUM CHLORIDE 600; 310; 30; 20 MG/100ML; MG/100ML; MG/100ML; MG/100ML
INJECTION, SOLUTION INTRAVENOUS CONTINUOUS PRN
Status: DISCONTINUED | OUTPATIENT
Start: 2024-09-23 | End: 2024-09-23 | Stop reason: SURG

## 2024-09-23 RX ORDER — CHLORHEXIDINE GLUCONATE 40 MG/ML
SOLUTION TOPICAL
Status: DISCONTINUED | OUTPATIENT
Start: 2024-09-23 | End: 2024-09-23 | Stop reason: HOSPADM

## 2024-09-23 RX ORDER — METOCLOPRAMIDE HYDROCHLORIDE 5 MG/ML
5 INJECTION INTRAMUSCULAR; INTRAVENOUS EVERY 8 HOURS PRN
Status: DISCONTINUED | OUTPATIENT
Start: 2024-09-23 | End: 2024-09-23 | Stop reason: HOSPADM

## 2024-09-23 RX ORDER — METOCLOPRAMIDE HYDROCHLORIDE 5 MG/ML
INJECTION INTRAMUSCULAR; INTRAVENOUS AS NEEDED
Status: DISCONTINUED | OUTPATIENT
Start: 2024-09-23 | End: 2024-09-23

## 2024-09-23 RX ORDER — HYDROMORPHONE HYDROCHLORIDE 1 MG/ML
0.2 INJECTION, SOLUTION INTRAMUSCULAR; INTRAVENOUS; SUBCUTANEOUS EVERY 5 MIN PRN
Status: DISCONTINUED | OUTPATIENT
Start: 2024-09-23 | End: 2024-09-23 | Stop reason: HOSPADM

## 2024-09-23 RX ORDER — HYDROCODONE BITARTRATE AND ACETAMINOPHEN 5; 325 MG/1; MG/1
1 TABLET ORAL ONCE AS NEEDED
Status: DISCONTINUED | OUTPATIENT
Start: 2024-09-23 | End: 2024-09-23 | Stop reason: HOSPADM

## 2024-09-23 RX ORDER — ISOPROTERENOL HYDROCHLORIDE 0.2 MG/ML
INJECTION, SOLUTION INTRAVENOUS
Status: COMPLETED
Start: 2024-09-23 | End: 2024-09-23

## 2024-09-23 RX ORDER — SODIUM CHLORIDE 9 MG/ML
INJECTION, SOLUTION INTRAVENOUS
Status: COMPLETED | OUTPATIENT
Start: 2024-09-24 | End: 2024-09-23

## 2024-09-23 RX ORDER — ACETAMINOPHEN 500 MG
1000 TABLET ORAL ONCE AS NEEDED
Status: DISCONTINUED | OUTPATIENT
Start: 2024-09-23 | End: 2024-09-23 | Stop reason: HOSPADM

## 2024-09-23 RX ORDER — HEPARIN SODIUM 5000 [USP'U]/ML
INJECTION, SOLUTION INTRAVENOUS; SUBCUTANEOUS
Status: COMPLETED
Start: 2024-09-23 | End: 2024-09-23

## 2024-09-23 RX ORDER — LIDOCAINE HYDROCHLORIDE 10 MG/ML
INJECTION, SOLUTION EPIDURAL; INFILTRATION; INTRACAUDAL; PERINEURAL
Status: COMPLETED
Start: 2024-09-23 | End: 2024-09-23

## 2024-09-23 RX ORDER — NICOTINE POLACRILEX 4 MG
30 LOZENGE BUCCAL
Status: DISCONTINUED | OUTPATIENT
Start: 2024-09-23 | End: 2024-09-23 | Stop reason: HOSPADM

## 2024-09-23 RX ORDER — ONDANSETRON 2 MG/ML
4 INJECTION INTRAMUSCULAR; INTRAVENOUS EVERY 6 HOURS PRN
Status: DISCONTINUED | OUTPATIENT
Start: 2024-09-23 | End: 2024-09-23 | Stop reason: HOSPADM

## 2024-09-23 RX ORDER — ONDANSETRON 2 MG/ML
INJECTION INTRAMUSCULAR; INTRAVENOUS AS NEEDED
Status: DISCONTINUED | OUTPATIENT
Start: 2024-09-23 | End: 2024-09-23 | Stop reason: SURG

## 2024-09-23 RX ORDER — LIDOCAINE HYDROCHLORIDE 10 MG/ML
INJECTION, SOLUTION EPIDURAL; INFILTRATION; INTRACAUDAL; PERINEURAL AS NEEDED
Status: DISCONTINUED | OUTPATIENT
Start: 2024-09-23 | End: 2024-09-23 | Stop reason: SURG

## 2024-09-23 RX ORDER — NEOSTIGMINE METHYLSULFATE 1 MG/ML
INJECTION INTRAVENOUS AS NEEDED
Status: DISCONTINUED | OUTPATIENT
Start: 2024-09-23 | End: 2024-09-23 | Stop reason: SURG

## 2024-09-23 RX ORDER — SODIUM CHLORIDE 9 MG/ML
INJECTION, SOLUTION INTRAVENOUS CONTINUOUS PRN
Status: DISCONTINUED | OUTPATIENT
Start: 2024-09-23 | End: 2024-09-23 | Stop reason: SURG

## 2024-09-23 RX ORDER — ROCURONIUM BROMIDE 10 MG/ML
INJECTION, SOLUTION INTRAVENOUS AS NEEDED
Status: DISCONTINUED | OUTPATIENT
Start: 2024-09-23 | End: 2024-09-23 | Stop reason: SURG

## 2024-09-23 RX ORDER — HYDROMORPHONE HYDROCHLORIDE 1 MG/ML
0.4 INJECTION, SOLUTION INTRAMUSCULAR; INTRAVENOUS; SUBCUTANEOUS EVERY 5 MIN PRN
Status: DISCONTINUED | OUTPATIENT
Start: 2024-09-23 | End: 2024-09-23 | Stop reason: HOSPADM

## 2024-09-23 RX ORDER — HYDROCODONE BITARTRATE AND ACETAMINOPHEN 5; 325 MG/1; MG/1
2 TABLET ORAL ONCE AS NEEDED
Status: DISCONTINUED | OUTPATIENT
Start: 2024-09-23 | End: 2024-09-23 | Stop reason: HOSPADM

## 2024-09-23 RX ORDER — MIDAZOLAM HYDROCHLORIDE 1 MG/ML
INJECTION INTRAMUSCULAR; INTRAVENOUS AS NEEDED
Status: DISCONTINUED | OUTPATIENT
Start: 2024-09-23 | End: 2024-09-23 | Stop reason: SURG

## 2024-09-23 RX ORDER — HEPARIN SODIUM 1000 [USP'U]/ML
INJECTION, SOLUTION INTRAVENOUS; SUBCUTANEOUS
Status: COMPLETED
Start: 2024-09-23 | End: 2024-09-23

## 2024-09-23 RX ORDER — SODIUM CHLORIDE, SODIUM LACTATE, POTASSIUM CHLORIDE, CALCIUM CHLORIDE 600; 310; 30; 20 MG/100ML; MG/100ML; MG/100ML; MG/100ML
INJECTION, SOLUTION INTRAVENOUS CONTINUOUS
Status: DISCONTINUED | OUTPATIENT
Start: 2024-09-23 | End: 2024-09-23 | Stop reason: HOSPADM

## 2024-09-23 RX ORDER — GLYCOPYRROLATE 0.2 MG/ML
INJECTION, SOLUTION INTRAMUSCULAR; INTRAVENOUS AS NEEDED
Status: DISCONTINUED | OUTPATIENT
Start: 2024-09-23 | End: 2024-09-23 | Stop reason: SURG

## 2024-09-23 RX ORDER — HYDROMORPHONE HYDROCHLORIDE 1 MG/ML
0.6 INJECTION, SOLUTION INTRAMUSCULAR; INTRAVENOUS; SUBCUTANEOUS EVERY 5 MIN PRN
Status: DISCONTINUED | OUTPATIENT
Start: 2024-09-23 | End: 2024-09-23 | Stop reason: HOSPADM

## 2024-09-23 RX ADMIN — ONDANSETRON 4 MG: 2 INJECTION INTRAMUSCULAR; INTRAVENOUS at 12:24:00

## 2024-09-23 RX ADMIN — GLYCOPYRROLATE 0.6 MG: 0.2 INJECTION, SOLUTION INTRAMUSCULAR; INTRAVENOUS at 13:23:00

## 2024-09-23 RX ADMIN — SODIUM CHLORIDE: 9 INJECTION, SOLUTION INTRAVENOUS at 12:22:00

## 2024-09-23 RX ADMIN — SODIUM CHLORIDE, SODIUM LACTATE, POTASSIUM CHLORIDE, CALCIUM CHLORIDE: 600; 310; 30; 20 INJECTION, SOLUTION INTRAVENOUS at 12:12:00

## 2024-09-23 RX ADMIN — NEOSTIGMINE METHYLSULFATE 3 MG: 1 INJECTION INTRAVENOUS at 13:23:00

## 2024-09-23 RX ADMIN — SODIUM CHLORIDE: 9 INJECTION, SOLUTION INTRAVENOUS at 11:34:00

## 2024-09-23 RX ADMIN — MIDAZOLAM HYDROCHLORIDE 2 MG: 1 INJECTION INTRAMUSCULAR; INTRAVENOUS at 12:13:00

## 2024-09-23 RX ADMIN — SODIUM CHLORIDE, SODIUM LACTATE, POTASSIUM CHLORIDE, CALCIUM CHLORIDE: 600; 310; 30; 20 INJECTION, SOLUTION INTRAVENOUS at 13:45:00

## 2024-09-23 RX ADMIN — METOCLOPRAMIDE HYDROCHLORIDE 10 MG: 5 INJECTION INTRAMUSCULAR; INTRAVENOUS at 12:24:00

## 2024-09-23 RX ADMIN — ROCURONIUM BROMIDE 40 MG: 10 INJECTION, SOLUTION INTRAVENOUS at 12:13:00

## 2024-09-23 RX ADMIN — LIDOCAINE HYDROCHLORIDE 50 MG: 10 INJECTION, SOLUTION EPIDURAL; INFILTRATION; INTRACAUDAL; PERINEURAL at 12:13:00

## 2024-09-23 NOTE — ANESTHESIA POSTPROCEDURE EVALUATION
[facility]    Nadege Cruz Patient Status:  Outpatient   Age/Gender 68 year old female MRN [id number]   Location [facility] INTERVENTIONAL SUITES Attending Cristopher Timmons MD   Hosp Day # 0 PCP Kory Cleary MD       Anesthesia Post-op Note        Procedure Summary       Date: 09/23/24 Room / Location: [facility] Interventional Suites    Anesthesia Start: 1203 Anesthesia Stop: 1347    Procedure: CATH EP Diagnosis:       SVT (supraventricular tachycardia) (HCC)      SVT (supraventricular tachycardia) (HCC)    Scheduled Providers: Srinivas Miller MD Anesthesiologist: Srinivas Miller MD    Anesthesia Type: general ASA Status: 2            Anesthesia Type: general    Vitals Value Taken Time   /78 09/23/24 1348   Temp 97.1 09/23/24 1348   Pulse 64 09/23/24 1348   Resp 18 09/23/24 1348   SpO2 98 09/23/24 1348       Patient Location: PACU    Anesthesia Type: general    Airway Patency: patent and extubated    Postop Pain Control: adequate    Mental Status: mildly sedated but able to meaningfully participate in the post-anesthesia evaluation    Nausea/Vomiting: none    Cardiopulmonary/Hydration status: stable euvolemic    Complications: no apparent anesthesia related complications    Postop vital signs: stable    Dental Exam: Unchanged from Preop    Patient to be discharged from PACU when criteria met.

## 2024-09-23 NOTE — ANESTHESIA PROCEDURE NOTES
Arterial Line    Date/Time: 9/23/2024 12:25 PM    Performed by: Srinivas Miller MD  Authorized by: Srinivas Miller MD    General Information and Staff    Procedure Start:  9/23/2024 12:25 PM  Procedure End:  9/23/2024 12:27 PM  Anesthesiologist:  Srinivas Miller MD  Performed By:  Anesthesiologist  Patient Location:  OR  Indication: continuous blood pressure monitoring and blood sampling needed    Site Identification: real time ultrasound guided    Preanesthetic Checklist: 2 patient identifiers, IV checked, risks and benefits discussed, monitors and equipment checked, pre-op evaluation, timeout performed, anesthesia consent and sterile technique used    Procedure Details    Catheter Size:  20 G  Catheter Length:  1 and 3/4 inch  Catheter Type:  Arrow  Seldinger Technique?: Yes    Laterality:  Left  Site:  Radial artery  Site Prep: chlorhexidine    Line Secured:  Wrist Brace, tape and Tegaderm    Assessment    Events: patient tolerated procedure well with no complications      Medications  9/23/2024 12:25 PM      Additional Comments

## 2024-09-23 NOTE — DISCHARGE INSTRUCTIONS
HOME CARE INSTRUCTIONS FOLLOWING CARDIAC ABLATION (RFA) OR ELECTROPHYSIOLOGIC STUDY (EPS)    Activity:    - DO NOT drive after the procedure. You may resume driving late the following day according to the nurse or physician's instructions.  - Plan on resting and relaxing tonight and tomorrow. It will be normal to tire easily for the first few days, depending on the length of the procedure and the amount of sedation you received.   - DO NOT lift anything over 10 pounds for the next 24 hours.  - Avoid sexual activity for the next 24 hours.  - Avoid repeated stair use and excessive walking for the next 24 hours.   - Avoid drinking alcohol for the next 24 hours.  - Resume your normal activity after 48 hours, or as instructed by your physician.      What is Normal:    - You may feel extra heart beats. If these beats come too often or you feel an episode of multiple fast heartbeats, notify your physician.  - The procedure site may appear bruised or discolored.  - There may be a small amount of drainage on the bandage  - There may be mild tenderness to the procedure site when touched, which is common.       Special Instructions:    - The bandage is to remain in place for 24 hours. Keep the bandage clean and dry. Do not submerge the site for 72 hours (no tub, baths or pools).  - After 24 hours you must remove the bandage. Wash the procedure site gently with soap and water. If you choose to wear a bandaid for a few days, make sure it remains clean and dry and that it is changed daily.  - The day after the procedure you may shower after you remove your dressing (but not baths).    IF ANY BLEEDING OR SWELLING OCCUR FROM GROIN SITE, LAY FLAT, AND HAVE SOMEONE HOLD PRESSURE OVER THE SITE FOR 20 MINUTES. IF UNABLE TO STOP BLEEDING-CALL 911.      When you should NOTIFY YOUR PHYSICIAN:    - If you have shortness of breath or a persistent cough  - If you have chest pain (angina)  - If you have persistent pain at the procedure  site  - If you experience a fever with a temperature >101 degrees, chills, infection (redness, swelling, thick yellow drainage, or a foul odor from procedure site)      Other:    - You may resume your present diet, unless otherwise directed by your physician  - You may resume all of your medications as prescribed, unless otherwise directed by your physician. A list of your medications was provided to you at discharge.  - Please call your physician's office for a follow-up appointment. You should be seen in 2-4 weeks.  - Do not make any personal/business decisions and/or sign any legal documents for the next 24 hours.

## 2024-09-23 NOTE — H&P
Electrophysiology Procedure Note    aNdege Cruz Location: Cath Lab   CSN [id number] MRN [id number]   Admission Date 9/23/2024  Operation Date 09/23/24    Attending Physician Cristopher Timmons MD Operating Physician Cristopher Timmons MD     Pre-Operative Diagnosis: SVT    Post-Operative Diagnosis: Same as above    Procedure Performed:   1. Comprehensive electrophysiology study.   2. Coronary sinus catheter placement to record left atrial electrograms and pace the left atrium. .    3. Three-dimensional intracardiac mapping.   4. Ablation of Slow pathway for aVNRT  5. Additional Arrhythmias- None  6. Vascular closure with vascade  7. IV drug infusion- none    Indication: Symptomatic SVT    EBL: Minimal    Summary of Case: The patient was brought to the EP lab in a fasting and   nonsedated state after providing informed consent. . The right and left groins were prepped   and draped in a sterile fashion.     Sedation: Per anesthesia    Access:  Access was achieved with ultrasound guidance  Left Femoral Vein none  Right Femoral Vein -x 3  7 Northern Irish-decapolar to the coronary sinus  7 Northern Irish-RV apical catheter  8 Northern Irish-initially CRD 2 to the hiss then exchanged for a VISI go with Q dot catheter for ablation  Electrophysiology Study  Baseline measurements made. Pacing from the HRA/CS/RV/his bundle performed. 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  We reproducibly induced SVT at 600/320.  Induction method single atrial extrastimuli with a jump then echoes then SVT  TCL -360 ms  Termination-ventricular overdrive pacing with VA block  Maneuvers/Evidence -septal VA time 0.  Ventricular  overdrive pacing with entrainment showed a VA HV response.  Post pacing interval minus tachycardia cycle length was 160 ms    Ablation  Irrigated tip catheter ablation was performed at the region of the slow pathway.  We had good junctional's with one-to-one retrograde conduction throughout the ablation      CONCLUSIONS:   1. Sinus node function normal.   2. Atrioventricular node function normal.   3. His-Purkinje function normal.   4.  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  Ablation of the slow pathway with good junctional's.  No evidence of slow pathway post procedure  6.  Vascade venous closure  \7.  Device check pre and post with stable impedances.  Patient set AAIR-DDDR      Complications:  None      Plan:    1)Meds: -No changes  2) Bedrest - 2 hours  3) Follow up -1 month              Cristopher Timmons MD    Cardiac Electrophysiololgy  [facility]  9/23/2024

## 2024-09-23 NOTE — PROGRESS NOTES
Pt s/p ablation via R fem vein with vascade closure. Post procedure VSS. Pt denied pain. Tolerated PO intake. R fem site CDI with no bleeding or hematoma noted throughout recovery period including after voiding and ambulating. IV's d/c'd. Discharge instructions given-pt verbalized understanding. Pt to lobby via WC and son to drive home.

## 2024-09-23 NOTE — ANESTHESIA PROCEDURE NOTES
Airway  Date/Time: 9/23/2024 12:15 PM  Urgency: elective      General Information and Staff    Patient location during procedure: OR  Anesthesiologist: Srinivas Miller MD  Performed: anesthesiologist   Performed by: Srinivas Miller MD  Authorized by: Srinivas Miller MD      Indications and Patient Condition  Indications for airway management: anesthesia  Sedation level: deep  Preoxygenated: yes  Patient position: sniffing  Mask difficulty assessment: 1 - vent by mask    Final Airway Details  Final airway type: endotracheal airway      Successful airway: ETT  Cuffed: yes   Successful intubation technique: Video laryngoscopy  Facilitating devices/methods: intubating stylet  Endotracheal tube insertion site: oral  Blade: GlideScope  Blade size: #3  ETT size (mm): 7.0    Cormack-Lehane Classification: grade I - full view of glottis  Placement verified by: capnometry   Measured from: lips  Number of attempts at approach: 1

## 2024-10-04 ENCOUNTER — APPOINTMENT (OUTPATIENT)
Dept: GENERAL RADIOLOGY | Facility: HOSPITAL | Age: 69
End: 2024-10-04
Attending: EMERGENCY MEDICINE
Payer: MEDICARE

## 2024-10-04 ENCOUNTER — HOSPITAL ENCOUNTER (OUTPATIENT)
Facility: HOSPITAL | Age: 69
Setting detail: OBSERVATION
Discharge: HOME | End: 2024-10-06
Attending: EMERGENCY MEDICINE | Admitting: STUDENT IN AN ORGANIZED HEALTH CARE EDUCATION/TRAINING PROGRAM
Payer: MEDICARE

## 2024-10-04 DIAGNOSIS — R07.9 CHEST PAIN WITH HIGH RISK FOR CARDIAC ETIOLOGY: Primary | ICD-10-CM

## 2024-10-04 DIAGNOSIS — D64.9 ANEMIA, UNSPECIFIED TYPE: ICD-10-CM

## 2024-10-04 LAB
ALBUMIN SERPL-MCNC: 4 G/DL (ref 3.2–4.8)
ALBUMIN/GLOB SERPL: 1 {RATIO} (ref 1–2)
ALP LIVER SERPL-CCNC: 171 U/L
ALT SERPL-CCNC: 16 U/L
ANION GAP SERPL CALC-SCNC: 7 MMOL/L (ref 0–18)
AST SERPL-CCNC: 17 U/L (ref ?–34)
BASOPHILS # BLD AUTO: 0.03 X10(3) UL (ref 0–0.2)
BASOPHILS NFR BLD AUTO: 0.4 %
BILIRUB SERPL-MCNC: 0.4 MG/DL (ref 0.2–1.1)
BUN BLD-MCNC: 13 MG/DL (ref 9–23)
CALCIUM BLD-MCNC: 9.9 MG/DL (ref 8.7–10.4)
CHLORIDE SERPL-SCNC: 102 MMOL/L (ref 98–112)
CO2 SERPL-SCNC: 27 MMOL/L (ref 21–32)
CREAT BLD-MCNC: 0.87 MG/DL
EGFRCR SERPLBLD CKD-EPI 2021: 73 ML/MIN/1.73M2 (ref 60–?)
EOSINOPHIL # BLD AUTO: 0.05 X10(3) UL (ref 0–0.7)
EOSINOPHIL NFR BLD AUTO: 0.7 %
ERYTHROCYTE [DISTWIDTH] IN BLOOD BY AUTOMATED COUNT: 14.3 %
GLOBULIN PLAS-MCNC: 4 G/DL (ref 2–3.5)
GLUCOSE BLD-MCNC: 142 MG/DL (ref 70–99)
HCT VFR BLD AUTO: 33.2 %
HGB BLD-MCNC: 11.4 G/DL
IMM GRANULOCYTES # BLD AUTO: 0.01 X10(3) UL (ref 0–1)
IMM GRANULOCYTES NFR BLD: 0.1 %
LYMPHOCYTES # BLD AUTO: 2.11 X10(3) UL (ref 1–4)
LYMPHOCYTES NFR BLD AUTO: 30 %
MCH RBC QN AUTO: 28.6 PG (ref 26–34)
MCHC RBC AUTO-ENTMCNC: 34.3 G/DL (ref 31–37)
MCV RBC AUTO: 83.2 FL
MONOCYTES # BLD AUTO: 0.51 X10(3) UL (ref 0.1–1)
MONOCYTES NFR BLD AUTO: 7.3 %
NEUTROPHILS # BLD AUTO: 4.32 X10 (3) UL (ref 1.5–7.7)
NEUTROPHILS # BLD AUTO: 4.32 X10(3) UL (ref 1.5–7.7)
NEUTROPHILS NFR BLD AUTO: 61.5 %
OSMOLALITY SERPL CALC.SUM OF ELEC: 285 MOSM/KG (ref 275–295)
PLATELET # BLD AUTO: 304 10(3)UL (ref 150–450)
POTASSIUM SERPL-SCNC: 4.4 MMOL/L (ref 3.5–5.1)
PROT SERPL-MCNC: 8 G/DL (ref 5.7–8.2)
RBC # BLD AUTO: 3.99 X10(6)UL
SODIUM SERPL-SCNC: 136 MMOL/L (ref 136–145)
TROPONIN I SERPL HS-MCNC: 3 NG/L
WBC # BLD AUTO: 7 X10(3) UL (ref 4–11)

## 2024-10-04 PROCEDURE — 71045 X-RAY EXAM CHEST 1 VIEW: CPT | Performed by: EMERGENCY MEDICINE

## 2024-10-04 RX ORDER — ONDANSETRON 2 MG/ML
4 INJECTION INTRAMUSCULAR; INTRAVENOUS ONCE
Status: COMPLETED | OUTPATIENT
Start: 2024-10-04 | End: 2024-10-04

## 2024-10-04 RX ORDER — MORPHINE SULFATE 4 MG/ML
4 INJECTION, SOLUTION INTRAMUSCULAR; INTRAVENOUS ONCE
Status: COMPLETED | OUTPATIENT
Start: 2024-10-04 | End: 2024-10-04

## 2024-10-04 RX ORDER — ASPIRIN 81 MG/1
TABLET, CHEWABLE ORAL
Status: COMPLETED
Start: 2024-10-04 | End: 2024-10-04

## 2024-10-04 RX ORDER — NITROGLYCERIN 0.4 MG/1
0.4 TABLET SUBLINGUAL ONCE
Status: COMPLETED | OUTPATIENT
Start: 2024-10-04 | End: 2024-10-04

## 2024-10-04 RX ORDER — ASPIRIN 325 MG
325 TABLET, DELAYED RELEASE (ENTERIC COATED) ORAL ONCE
Status: COMPLETED | OUTPATIENT
Start: 2024-10-04 | End: 2024-10-04

## 2024-10-05 ENCOUNTER — APPOINTMENT (OUTPATIENT)
Dept: NUCLEAR MEDICINE | Facility: HOSPITAL | Age: 69
End: 2024-10-05
Attending: HOSPITALIST
Payer: MEDICARE

## 2024-10-05 PROBLEM — R07.9 CHEST PAIN WITH HIGH RISK FOR CARDIAC ETIOLOGY: Status: ACTIVE | Noted: 2024-10-05

## 2024-10-05 PROBLEM — D64.9 ANEMIA, UNSPECIFIED TYPE: Status: ACTIVE | Noted: 2024-10-05

## 2024-10-05 LAB
ANION GAP SERPL CALC-SCNC: 6 MMOL/L (ref 0–18)
BILIRUB UR QL STRIP.AUTO: NEGATIVE
BUN BLD-MCNC: 12 MG/DL (ref 9–23)
CALCIUM BLD-MCNC: 9.7 MG/DL (ref 8.7–10.4)
CHLORIDE SERPL-SCNC: 103 MMOL/L (ref 98–112)
CLARITY UR REFRACT.AUTO: CLEAR
CO2 SERPL-SCNC: 27 MMOL/L (ref 21–32)
COLOR UR AUTO: YELLOW
CREAT BLD-MCNC: 0.85 MG/DL
D DIMER PPP FEU-MCNC: 0.89 UG/ML FEU (ref ?–0.67)
EGFRCR SERPLBLD CKD-EPI 2021: 75 ML/MIN/1.73M2 (ref 60–?)
GLUCOSE BLD-MCNC: 120 MG/DL (ref 70–99)
GLUCOSE BLD-MCNC: 121 MG/DL (ref 70–99)
GLUCOSE BLD-MCNC: 145 MG/DL (ref 70–99)
GLUCOSE BLD-MCNC: 152 MG/DL (ref 70–99)
GLUCOSE BLD-MCNC: 165 MG/DL (ref 70–99)
GLUCOSE BLD-MCNC: 167 MG/DL (ref 70–99)
GLUCOSE UR STRIP.AUTO-MCNC: NORMAL MG/DL
KETONES UR STRIP.AUTO-MCNC: NEGATIVE MG/DL
LEUKOCYTE ESTERASE UR QL STRIP.AUTO: 25
NITRITE UR QL STRIP.AUTO: NEGATIVE
OSMOLALITY SERPL CALC.SUM OF ELEC: 285 MOSM/KG (ref 275–295)
PH UR STRIP.AUTO: 5.5 [PH] (ref 5–8)
POTASSIUM SERPL-SCNC: 4.1 MMOL/L (ref 3.5–5.1)
RBC UR QL AUTO: NEGATIVE
SODIUM SERPL-SCNC: 136 MMOL/L (ref 136–145)
SP GR UR STRIP.AUTO: 1.03 (ref 1–1.03)
TROPONIN I SERPL HS-MCNC: <3 NG/L
TROPONIN I SERPL HS-MCNC: <3 NG/L
UROBILINOGEN UR STRIP.AUTO-MCNC: NORMAL MG/DL

## 2024-10-05 PROCEDURE — 78582 LUNG VENTILAT&PERFUS IMAGING: CPT | Performed by: HOSPITALIST

## 2024-10-05 PROCEDURE — 99223 1ST HOSP IP/OBS HIGH 75: CPT | Performed by: STUDENT IN AN ORGANIZED HEALTH CARE EDUCATION/TRAINING PROGRAM

## 2024-10-05 RX ORDER — HYDROMORPHONE HYDROCHLORIDE 1 MG/ML
0.4 INJECTION, SOLUTION INTRAMUSCULAR; INTRAVENOUS; SUBCUTANEOUS EVERY 4 HOURS PRN
Status: DISCONTINUED | OUTPATIENT
Start: 2024-10-05 | End: 2024-10-06

## 2024-10-05 RX ORDER — POLYETHYLENE GLYCOL 3350 17 G/17G
17 POWDER, FOR SOLUTION ORAL DAILY PRN
Status: DISCONTINUED | OUTPATIENT
Start: 2024-10-05 | End: 2024-10-06

## 2024-10-05 RX ORDER — METOCLOPRAMIDE HYDROCHLORIDE 5 MG/ML
5 INJECTION INTRAMUSCULAR; INTRAVENOUS EVERY 6 HOURS
Status: DISCONTINUED | OUTPATIENT
Start: 2024-10-05 | End: 2024-10-06

## 2024-10-05 RX ORDER — NICOTINE POLACRILEX 4 MG
15 LOZENGE BUCCAL
Status: DISCONTINUED | OUTPATIENT
Start: 2024-10-05 | End: 2024-10-06

## 2024-10-05 RX ORDER — HYDRALAZINE HYDROCHLORIDE 50 MG/1
50 TABLET, FILM COATED ORAL DAILY
Status: DISCONTINUED | OUTPATIENT
Start: 2024-10-05 | End: 2024-10-06

## 2024-10-05 RX ORDER — ATORVASTATIN CALCIUM 10 MG/1
10 TABLET, FILM COATED ORAL NIGHTLY
Status: DISCONTINUED | OUTPATIENT
Start: 2024-10-05 | End: 2024-10-06

## 2024-10-05 RX ORDER — SODIUM PHOSPHATE, DIBASIC AND SODIUM PHOSPHATE, MONOBASIC 7; 19 G/230ML; G/230ML
1 ENEMA RECTAL ONCE AS NEEDED
Status: DISCONTINUED | OUTPATIENT
Start: 2024-10-05 | End: 2024-10-06

## 2024-10-05 RX ORDER — MORPHINE SULFATE 4 MG/ML
4 INJECTION, SOLUTION INTRAMUSCULAR; INTRAVENOUS ONCE
Status: COMPLETED | OUTPATIENT
Start: 2024-10-05 | End: 2024-10-05

## 2024-10-05 RX ORDER — AMLODIPINE AND BENAZEPRIL HYDROCHLORIDE 10; 20 MG/1; MG/1
1 CAPSULE ORAL
Status: DISCONTINUED | OUTPATIENT
Start: 2024-10-05 | End: 2024-10-05

## 2024-10-05 RX ORDER — CYCLOBENZAPRINE HCL 5 MG
5 TABLET ORAL 3 TIMES DAILY PRN
Status: DISCONTINUED | OUTPATIENT
Start: 2024-10-05 | End: 2024-10-06

## 2024-10-05 RX ORDER — NICOTINE POLACRILEX 4 MG
30 LOZENGE BUCCAL
Status: DISCONTINUED | OUTPATIENT
Start: 2024-10-05 | End: 2024-10-06

## 2024-10-05 RX ORDER — ASPIRIN 81 MG/1
81 TABLET ORAL DAILY
Status: DISCONTINUED | OUTPATIENT
Start: 2024-10-05 | End: 2024-10-06

## 2024-10-05 RX ORDER — HYDROCODONE BITARTRATE AND ACETAMINOPHEN 10; 325 MG/1; MG/1
1 TABLET ORAL EVERY 4 HOURS PRN
Status: DISCONTINUED | OUTPATIENT
Start: 2024-10-05 | End: 2024-10-06

## 2024-10-05 RX ORDER — ONDANSETRON 2 MG/ML
4 INJECTION INTRAMUSCULAR; INTRAVENOUS EVERY 6 HOURS PRN
Status: DISCONTINUED | OUTPATIENT
Start: 2024-10-05 | End: 2024-10-06

## 2024-10-05 RX ORDER — METHOCARBAMOL 500 MG/1
500 TABLET, FILM COATED ORAL EVERY 12 HOURS
Status: DISCONTINUED | OUTPATIENT
Start: 2024-10-06 | End: 2024-10-06

## 2024-10-05 RX ORDER — DEXTROSE MONOHYDRATE 25 G/50ML
50 INJECTION, SOLUTION INTRAVENOUS
Status: DISCONTINUED | OUTPATIENT
Start: 2024-10-05 | End: 2024-10-06

## 2024-10-05 RX ORDER — BENZONATATE 200 MG/1
200 CAPSULE ORAL 3 TIMES DAILY PRN
Status: DISCONTINUED | OUTPATIENT
Start: 2024-10-05 | End: 2024-10-06

## 2024-10-05 RX ORDER — METHOCARBAMOL 100 MG/ML
500 INJECTION, SOLUTION INTRAMUSCULAR; INTRAVENOUS EVERY 12 HOURS
Status: COMPLETED | OUTPATIENT
Start: 2024-10-05 | End: 2024-10-05

## 2024-10-05 RX ORDER — METOPROLOL SUCCINATE 50 MG/1
50 TABLET, EXTENDED RELEASE ORAL
Status: DISCONTINUED | OUTPATIENT
Start: 2024-10-05 | End: 2024-10-06

## 2024-10-05 RX ORDER — ACETAMINOPHEN 500 MG
1000 TABLET ORAL EVERY 6 HOURS
Status: DISCONTINUED | OUTPATIENT
Start: 2024-10-05 | End: 2024-10-06

## 2024-10-05 RX ORDER — BISACODYL 10 MG
10 SUPPOSITORY, RECTAL RECTAL
Status: DISCONTINUED | OUTPATIENT
Start: 2024-10-05 | End: 2024-10-06

## 2024-10-05 RX ORDER — NITROGLYCERIN 0.4 MG/1
0.4 TABLET SUBLINGUAL EVERY 5 MIN PRN
Status: DISCONTINUED | OUTPATIENT
Start: 2024-10-05 | End: 2024-10-06

## 2024-10-05 RX ORDER — ACETAMINOPHEN 500 MG
500 TABLET ORAL EVERY 4 HOURS PRN
Status: DISCONTINUED | OUTPATIENT
Start: 2024-10-05 | End: 2024-10-05

## 2024-10-05 RX ORDER — SENNOSIDES 8.6 MG
17.2 TABLET ORAL NIGHTLY PRN
Status: DISCONTINUED | OUTPATIENT
Start: 2024-10-05 | End: 2024-10-06

## 2024-10-05 RX ORDER — MELATONIN
3 NIGHTLY PRN
Status: DISCONTINUED | OUTPATIENT
Start: 2024-10-05 | End: 2024-10-06

## 2024-10-05 RX ORDER — METOCLOPRAMIDE 5 MG/1
5 TABLET ORAL
Status: DISCONTINUED | OUTPATIENT
Start: 2024-10-05 | End: 2024-10-05

## 2024-10-05 RX ORDER — ENOXAPARIN SODIUM 100 MG/ML
40 INJECTION SUBCUTANEOUS DAILY
Status: DISCONTINUED | OUTPATIENT
Start: 2024-10-05 | End: 2024-10-05

## 2024-10-05 RX ORDER — KETOTIFEN FUMARATE 0.35 MG/ML
1 SOLUTION/ DROPS OPHTHALMIC 2 TIMES DAILY
Status: DISCONTINUED | OUTPATIENT
Start: 2024-10-05 | End: 2024-10-06

## 2024-10-05 RX ORDER — ECHINACEA PURPUREA EXTRACT 125 MG
1 TABLET ORAL
Status: DISCONTINUED | OUTPATIENT
Start: 2024-10-05 | End: 2024-10-06

## 2024-10-05 RX ORDER — HYDROMORPHONE HYDROCHLORIDE 1 MG/ML
0.8 INJECTION, SOLUTION INTRAMUSCULAR; INTRAVENOUS; SUBCUTANEOUS EVERY 4 HOURS PRN
Status: DISCONTINUED | OUTPATIENT
Start: 2024-10-05 | End: 2024-10-06

## 2024-10-05 RX ORDER — METOCLOPRAMIDE HYDROCHLORIDE 5 MG/ML
5 INJECTION INTRAMUSCULAR; INTRAVENOUS EVERY 8 HOURS PRN
Status: DISCONTINUED | OUTPATIENT
Start: 2024-10-05 | End: 2024-10-05

## 2024-10-05 NOTE — PLAN OF CARE
NURSING ADMISSION NOTE      Patient admitted via Cart  Oriented to room.  Safety precautions initiated.  Bed in low position.  Call light in reach.      Pt Oriented to room. Call light in reach. Menu in room. Tele monitor on. VS taken. Head to Toe complete. Admission navigators complete including PTA medications. Consults aware of proper medications and of new patient.    Assumed patient at 0200. Alert and oriented x 4 on room air. Lung sounds diminished bilaterally. Normal sinus on tele. Skin assessment done with RN and PCT - skin intact. Continent of bowel and bladder. Up 1 and a walker. Patient updated on plan of care and verbalized understanding.     POC: Cards to see in morning, CT of abdomen and chest    Problem: Diabetes/Glucose Control  Goal: Glucose maintained within prescribed range  Description: INTERVENTIONS:  - Monitor Blood Glucose as ordered  - Assess for signs and symptoms of hyperglycemia and hypoglycemia  - Administer ordered medications to maintain glucose within target range  - Assess barriers to adequate nutritional intake and initiate nutrition consult as needed  - Instruct patient on self management of diabetes  Outcome: Progressing     Problem: Patient/Family Goals  Goal: Patient/Family Long Term Goal  Description: Patient's Long Term Goal: To go home    Interventions:  - medication as prescribed  - testing as ordered  - doctors to see  - See additional Care Plan goals for specific interventions  Outcome: Progressing  Goal: Patient/Family Short Term Goal  Description: Patient's Short Term Goal: to be out of pain    Interventions:   - Medications as prescribed  - Doctors to see  - Testing as ordered  - See additional Care Plan goals for specific interventions  Outcome: Progressing

## 2024-10-05 NOTE — PROGRESS NOTES
[facility]   part of [facility]     Hospitalist Progress Note     Nadege Cruz Patient Status:  Observation    10/14/1955 MRN [id number]   Location [facility] 2NE-A Attending Josh Haywood MD   Hosp Day # 0 PCP Kory Cleary MD     Chief Complaint: Chest pain    Subjective:   Patient with diffuse body aches starting in her back. Pain worse with movement. Difficulty turning her head d/t pain. Moves arms lowly d/t pain. No falls, injuries, accidents. Does mentions carrying some bags. No nausea or vomiting. Last BM on Thursday.     Current medications:   insulin aspart  2-10 Units Subcutaneous TID AC and HS    aspirin  81 mg Oral Daily    apixaban  5 mg Oral BID    atorvastatin  10 mg Oral Nightly    hydrALAZINE  50 mg Oral Daily    metoprolol succinate ER  50 mg Oral 2x Daily(Beta Blocker)    ketotifen  1 drop Both Eyes BID    methocarbamol  500 mg Intravenous Q12H    Followed by    [START ON 10/6/2024] methocarbamol  500 mg Oral Q12H    acetaminophen  1,000 mg Oral q6h    metoclopramide  5 mg Oral TID AC       Objective:    Review of Systems:   10 point ROS completed and was negative, except for pertinent positive and negatives stated in subjective.    Vital signs:  Temp:  [97.7 °F (36.5 °C)-97.8 °F (36.6 °C)] 97.8 °F (36.6 °C)  Pulse:  [60-68] 64  Resp:  [16-18] 18  BP: (139-152)/() 142/75  SpO2:  [96 %-100 %] 96 %  Patient Weight for the past 72 hrs:   Weight   10/04/24 2304 220 lb (99.8 kg)   10/05/24 0211 220 lb (99.8 kg)     Physical Exam:    General: No acute distress.   Respiratory: Clear to auscultation bilaterally.   Cardiovascular: S1, S2. Regular rate and rhythm.  Abdomen: Soft, nontender, nondistended.  Positive bowel sounds.   Extremities: No edema.  Diffuse tenderness to light palpation to neck, arms and limited back exam   Neuro: AAOx3    Diagnostic Data:    Labs:  Recent Labs   Lab 10/04/24  2309   WBC 7.0   HGB 11.4*   MCV 83.2   .0       Recent Labs   Lab  10/04/24  2309 10/05/24  0820   * 152*   BUN 13 12   CREATSERUM 0.87 0.85   CA 9.9 9.7   ALB 4.0  --     136   K 4.4 4.1    103   CO2 27.0 27.0   ALKPHO 171*  --    AST 17  --    ALT 16  --    BILT 0.4  --    TP 8.0  --        Estimated Creatinine Clearance: 60.1 mL/min (based on SCr of 0.85 mg/dL).    No results for input(s): \"PTP\", \"INR\" in the last 168 hours.         COVID-19 Lab Results    COVID-19  Lab Results   Component Value Date    COVID19 Not Detected 10/25/2022    COVID19 Not Detected 09/19/2022       Pro-Calcitonin  No results for input(s): \"PCT\" in the last 168 hours.    Cardiac  No results for input(s): \"TROP\", \"PBNP\" in the last 168 hours.    Creatinine Kinase  No results for input(s): \"CK\" in the last 168 hours.    Inflammatory Markers  No results for input(s): \"CRP\", \"CHRIS\", \"LDH\", \"DDIMER\" in the last 168 hours.    Recent Labs   Lab 10/04/24  2309 10/05/24  0406 10/05/24  0820   TROPHS 3 <3 <3       Imaging: Imaging data reviewed in Epic.    Medications:    insulin aspart  2-10 Units Subcutaneous TID AC and HS    aspirin  81 mg Oral Daily    apixaban  5 mg Oral BID    atorvastatin  10 mg Oral Nightly    hydrALAZINE  50 mg Oral Daily    metoprolol succinate ER  50 mg Oral 2x Daily(Beta Blocker)    ketotifen  1 drop Both Eyes BID    methocarbamol  500 mg Intravenous Q12H    Followed by    [START ON 10/6/2024] methocarbamol  500 mg Oral Q12H    acetaminophen  1,000 mg Oral q6h    metoclopramide  5 mg Oral TID AC       Assessment & Plan:    Chest pain, trop neg, atypical  SVT sp ablation 9/2024  SSS sp PPM  Essential hypertension  Dyslipidemia  Aspirin  Toprol, Hydralazine  Lipitor  Monitor hemodynamics  Telemetry  Cardiology consult   Back pain, diffuse muscle aches  Start Tylenol scheduled  Start Robaxin IV then PO  Add Flex PRN  IV Dilaudid PRN  Hold off on Toradol given DOAC use  VTE on DOAC  DOAC  Diabetes mellitus  Correctional scale   Add carb scale  Gastroparesis?  Start  Reglan  AMITA  Depression    Supplementary Documentation:   Quality:  DVT Prophylaxis: DOAC    At this point Ms. Cruz is expected to be discharge to: Home    Plan of care discussed with patient and RN.    Josh Haywood MD

## 2024-10-05 NOTE — CONSULTS
[facility]  Cardiology Consultation    Nadege Cruz Patient Status:  Observation    10/14/1955 MRN [id number]   Location [facility] 2NE-A Attending Josh Haywood MD   Hosp Day # 0 PCP Kory Cleary MD     Consults      Reason for Consultation     Chest/Abd pain        History of Present Illness     Nadege Cruz is a a(n) 68 year old female who presents with chest pain and abdominal fullness.  Patient had an SVT ablation recently.  Patient has had a normal nuclear stress test and echo in the recent past.    Patient recently started back on Ozempic and felt like her abdomen was filling up and she was full of gas.  This radiates up to her chest and arms          History:     Past Medical History:    Arrhythmia    Cataract    Deep vein thrombosis (HCC)    Depression    Diabetes (HCC)    Esophageal reflux    High blood pressure    High cholesterol    Sleep apnea    Visual impairment     Past Surgical History:   Procedure Laterality Date    Cardiac pacemaker placement  2020    Cataract      Upper gi endoscopy,exam       Family History   Problem Relation Age of Onset    Heart Disorder Father     Diabetes Father     Heart Disorder Mother     Diabetes Mother       reports that she has never smoked. She has never used smokeless tobacco. She reports that she does not drink alcohol and does not use drugs.      Allergies:     Allergies   Allergen Reactions    Radiology Contrast Iodinated Dyes CARDIAC ARREST    Sulfa Antibiotics HIVES    Vancomycin ITCHING    Corticosteroids NAUSEA ONLY     \"Passed out after taking them\"    Penicillins RASH         Medications       Current Facility-Administered Medications:     nitroglycerin (Nitrostat) SL tab 0.4 mg, 0.4 mg, Sublingual, Q5 Min PRN    glucose (Dex4) 15 GM/59ML oral liquid 15 g, 15 g, Oral, Q15 Min PRN **OR** glucose (Glutose) 40% oral gel 15 g, 15 g, Oral, Q15 Min PRN **OR** glucose-vitamin C (Dex-4) chewable tab 4 tablet, 4 tablet, Oral, Q15 Min PRN **OR**  dextrose 50% injection 50 mL, 50 mL, Intravenous, Q15 Min PRN **OR** glucose (Dex4) 15 GM/59ML oral liquid 30 g, 30 g, Oral, Q15 Min PRN **OR** glucose (Glutose) 40% oral gel 30 g, 30 g, Oral, Q15 Min PRN **OR** glucose-vitamin C (Dex-4) chewable tab 8 tablet, 8 tablet, Oral, Q15 Min PRN    acetaminophen (Tylenol Extra Strength) tab 500 mg, 500 mg, Oral, Q4H PRN    melatonin tab 3 mg, 3 mg, Oral, Nightly PRN    polyethylene glycol (PEG 3350) (Miralax) 17 g oral packet 17 g, 17 g, Oral, Daily PRN    sennosides (Senokot) tab 17.2 mg, 17.2 mg, Oral, Nightly PRN    bisacodyl (Dulcolax) 10 MG rectal suppository 10 mg, 10 mg, Rectal, Daily PRN    fleet enema (Fleet) rectal enema 133 mL, 1 enema, Rectal, Once PRN    ondansetron (Zofran) 4 MG/2ML injection 4 mg, 4 mg, Intravenous, Q6H PRN    metoclopramide (Reglan) 5 mg/mL injection 5 mg, 5 mg, Intravenous, Q8H PRN    insulin aspart (NovoLOG) 100 Units/mL FlexPen 2-10 Units, 2-10 Units, Subcutaneous, TID AC and HS    benzonatate (Tessalon) cap 200 mg, 200 mg, Oral, TID PRN    glycerin-hypromellose- (Artificial Tears) 0.2-0.2-1 % ophthalmic solution 1 drop, 1 drop, Both Eyes, QID PRN    sodium chloride (Saline Mist) 0.65 % nasal solution 1 spray, 1 spray, Each Nare, Q3H PRN    HYDROmorphone (Dilaudid) 1 MG/ML injection 0.4 mg, 0.4 mg, Intravenous, Q4H PRN **OR** HYDROmorphone (Dilaudid) 1 MG/ML injection 0.8 mg, 0.8 mg, Intravenous, Q4H PRN    aspirin DR tab 81 mg, 81 mg, Oral, Daily    apixaban (Eliquis) tab 5 mg, 5 mg, Oral, BID    atorvastatin (Lipitor) tab 10 mg, 10 mg, Oral, Nightly    hydrALAZINE (Apresoline) tab 50 mg, 50 mg, Oral, Daily    HYDROcodone-acetaminophen (Norco)  MG per tab 1 tablet, 1 tablet, Oral, Q4H PRN    metoprolol succinate ER (Toprol XL) 24 hr tab 50 mg, 50 mg, Oral, 2x Daily(Beta Blocker)    ketotifen (Zaditor) 0.035 % ophthalmic solution 1 drop, 1 drop, Both Eyes, BID      Review of Systems     10 point ROS was negative  except  Abdominal fullness      Telemetry:         Telemetry: Sinus rhythm in the 60s      Physical Exam     Physical Exam   Blood pressure 152/73, pulse 66, temperature 97.8 °F (36.6 °C), temperature source Oral, resp. rate 18, weight 220 lb (99.8 kg), SpO2 97%.  Temp (24hrs), Av.7 °F (36.5 °C), Min:97.7 °F (36.5 °C), Max:97.8 °F (36.6 °C)    Wt Readings from Last 3 Encounters:   10/05/24 220 lb (99.8 kg)   24 225 lb (102.1 kg)   23 240 lb (108.9 kg)     Well-appearing  NAD  PERRLA/EOMI  Neck veins not elevated  Carotids- no bruits  CTA bilaterally  Cardiac-regular rate rhythm S1-S2  Abdomen- Soft,Nontender, normal BS  Extremities- pulses normal  Edema-no edema  Mood /Affect Congruent  Skin- no lesions            Lab/Radiology Results     Recent Labs   Lab 10/04/24  2309   *   BUN 13   CREATSERUM 0.87   EGFRCR 73   CA 9.9      K 4.4      CO2 27.0     Recent Labs   Lab 10/04/24  2309   RBC 3.99   HGB 11.4*   HCT 33.2*   MCV 83.2   MCH 28.6   MCHC 34.3   RDW 14.3   NEPRELIM 4.32   WBC 7.0   .0         [unfilled]  No results for input(s): \"BNP\" in the last 168 hours.  No results found for: \"PT\", \"INR\"  No results found for: \"TROP\"      XR CHEST AP PORTABLE  (CPT=71045)    Result Date: 10/4/2024  CONCLUSION:  There are low lung volumes.  Dependent density left lower lung is most likely atelectasis although pneumonia is not excluded.   LOCATION:  [facility]      Dictated by (CST): Emil Bruce MD on 10/04/2024 at 11:45 PM     Finalized by (CST): Emil Bruce MD on 10/04/2024 at 11:45 PM       EKG 12 Lead    Result Date: 10/5/2024  Sinus rhythm with 1st degree A-V block Nonspecific T wave abnormality Abnormal ECG When compared with ECG of 23-SEP-2024 14:13, Sinus rhythm has replaced Electronic atrial pacemaker Nonspecific T wave abnormality now evident in Lateral leads       Problem List     Patient Active Problem List   Diagnosis    Chest pain with high risk for cardiac etiology     Anemia, unspecified type       Diagnostic Testing     ECG     TTE 3/2024-Normal EF  Stress Testing 3/2024- PET normal  EP Procedures 10/2/24- AVNRT Ablation    Assessment     CP- neg trops. Normal stress this year. Atypical   SVT- Ablation cecently  PPM  Anxiety      Plan     Will check a D-dimer however suspicion for PE is quite low.  Patient has a very severe allergy to contrast and will not consent to any iodinated contrast procedures.  No other further cardiac workup needed.  Echo and stress test are recent and does do not need to be repeated        C5      Cristopher Timmons MD    Cardiac Electrophysiology  [facility]  10/5/2024  8:22 AM

## 2024-10-05 NOTE — ED INITIAL ASSESSMENT (HPI)
Chest pain started two days ago, pain is constant worse with upper body movement   No ANDERSON, recent ablation    Sublingual nitro and asa completed per EMS

## 2024-10-05 NOTE — H&P
[facility]     Nadege Cruz Patient Status:  Emergency    10/14/1955 MRN [id number]   Location [facility] EMERGENCY DEPARTMENT Attending Curly Downs MD   Hosp Day # 0 PCP Kory Cleary MD     Chief Complaint: chest pain    Subjective:    History of Present Illness:     Nadege Cruz is a 68 year old female with PMHX SVT/ DVT/ DM/ HTN/ HLD/ AMIAT who presented to the hospital for difuse pain. She reports sudden pain from her bl upper abdomen to her neck for the past 3 days which has been getting progressively worse. The pain is sharp and rated 10/10 with no alleviating or exacerbating factors. She has been constipated with her last BM yesterday and had nausea with emesis and belching. She also reports bilateral flank pain but denied any urinary symptoms. She was getting sweaty at times at home but denied any fever. She has not been able to eat much. She denied any dyspnea, palpitations, dizziness. She saw the cardiology PA yesterday who advised her to go to the hospital. She did have an ablation with EP study for her SVT on 24.    History/Other:    Past Medical History:  Past Medical History:    Arrhythmia    Cataract    Deep vein thrombosis (HCC)    Depression    Diabetes (HCC)    Esophageal reflux    High blood pressure    High cholesterol    Sleep apnea    Visual impairment     Past Surgical History:   Past Surgical History:   Procedure Laterality Date    Cardiac pacemaker placement  2020    Cataract      Upper gi endoscopy,exam        Family History:   Family History   Problem Relation Age of Onset    Heart Disorder Father     Diabetes Father     Heart Disorder Mother     Diabetes Mother      Social History:    reports that she has never smoked. She has never used smokeless tobacco. She reports that she does not drink alcohol and does not use drugs.     Allergies:   Allergies   Allergen Reactions    Radiology Contrast Iodinated Dyes CARDIAC ARREST    Sulfa Antibiotics  HIVES    Vancomycin ITCHING    Corticosteroids NAUSEA ONLY     \"Passed out after taking them\"    Penicillins RASH       Medications:    Current Facility-Administered Medications on File Prior to Encounter   Medication Dose Route Frequency Provider Last Rate Last Admin    [COMPLETED] sodium chloride 0.9% infusion   Intravenous On Call Cristopher Timmons MD   Stopped at 09/23/24 1600    [COMPLETED] heparin (Porcine) 5000 UNIT/ML injection             [COMPLETED] lidocaine PF (Xylocaine-MPF) 1 % injection             [COMPLETED] heparin (Porcine) 1000 UNIT/ML injection             [COMPLETED] isoproterenol (Isuprel) 0.2 mg/mL injection              Current Outpatient Medications on File Prior to Encounter   Medication Sig Dispense Refill    apixaban 5 MG Oral Tab Take 1 tablet (5 mg total) by mouth 2 (two) times daily.      metoprolol succinate ER 50 MG Oral Tablet 24 Hr Take 1 tablet (50 mg total) by mouth in the morning and 1 tablet (50 mg total) before bedtime.      semaglutide 8 MG/3ML Subcutaneous Solution Pen-injector Inject 2 mg into the skin once a week. Takes on Mondays, last dose 9/16      atorvastatin 10 MG Oral Tab Take 1 tablet (10 mg total) by mouth at bedtime.      docusate sodium 100 MG Oral Cap Take 1 capsule by mouth daily as needed.      Fluorometholone Acetate (FLAREX) 0.1 % Ophthalmic Suspension Place 1 drop into both eyes 2 (two) times daily. For dry eyes      amLODIPine Besy-Benazepril HCl 10-20 MG Oral Cap Take 1 capsule by mouth daily with lunch.      aspirin 81 MG Oral Tab EC Take 1 tablet (81 mg total) by mouth daily.      Cholecalciferol (VITAMIN D3) 1.25 MG (10052 UT) Oral Cap Take 1 capsule by mouth once a week. Tuesday      hydrALAZINE 50 MG Oral Tab Take 1 tablet (50 mg total) by mouth in the morning, at noon, and at bedtime. (Patient not taking: Reported on 9/17/2024)      LINZESS 290 MCG Oral Cap Take 1 capsule (290 mcg total) by mouth every morning.      olopatadine 0.1 % Ophthalmic  Solution Place 1 drop into both eyes daily. For dry eyes      metFORMIN HCl 1000 MG Oral Tab Take 1 tablet (1,000 mg total) by mouth 2 (two) times daily with meals.      glipiZIDE 5 MG Oral Tab Take 8 mg by mouth every morning before breakfast.      HYDROcodone-acetaminophen  MG Oral Tab Take 1 tablet by mouth every 4 (four) hours as needed for Pain.         Review of Systems:   A comprehensive review of systems was completed.    Pertinent positives and negatives noted in the HPI.    Objective:   Physical Exam:    /85   Pulse 68   Temp 97.7 °F (36.5 °C) (Oral)   Resp 18   Wt 220 lb (99.8 kg)   SpO2 100%   BMI 35.51 kg/m²   General: restless, appears to be in pain, Alert  Respiratory: No rhonchi, no wheezes, on room air  Cardiovascular: S1, S2. Regular rate and rhythm  Abdomen: Soft, Non-tender, non-distended, decreased bowel sounds, bl +CVA tenderness  Neuro: No new focal deficits  Extremities: trace bl pre-tibial edema    Results:    Labs:      Labs Last 24 Hours:    Recent Labs   Lab 10/04/24  2309   RBC 3.99   HGB 11.4*   HCT 33.2*   MCV 83.2   MCH 28.6   MCHC 34.3   RDW 14.3   NEPRELIM 4.32   WBC 7.0   .0       Recent Labs   Lab 10/04/24  2309   *   BUN 13   CREATSERUM 0.87   EGFRCR 73   CA 9.9   ALB 4.0      K 4.4      CO2 27.0   ALKPHO 171*   AST 17   ALT 16   BILT 0.4   TP 8.0       No results found for: \"PT\", \"INR\"    Recent Labs   Lab 10/04/24  2309   TROPHS 3       No results for input(s): \"TROP\", \"PBNP\" in the last 168 hours.    No results for input(s): \"PCT\" in the last 168 hours.    Imaging: Imaging data reviewed in Epic.    Assessment & Plan:      #Chest/ abdominal/ flank pain  #IBS  -troponin 3  -EKG showing NSR @67 bpm with 1st degree AV block, t wave inversions aVR/ V1, ST elevation V2  -chest xray with low lung volumes but otherwise unremarkable  -r/o ACS, possible GI pathology ?gastroparesis 2/2 ozempic vs renal stone  -cont to trend troponin  -monitor  on telemetry  -cardiology c/s in ED  -obtain Ct A/P with PO (no IV contrast due to severe allergy in past)  -consider reglan TID if CT negative  -cont home linzess  -obtain UA    #normocytic anemia  -hgb 11.4: at baseline  -no signs active bleeding  -cont to monitor CBC    #DM  -SSI, QID checks, hypoglycemia protocol  -hold home metformin, glipizide, semaglutide    #DVT  -cont home apixaban    #SVT  -cont home metoprolol    #HLD  -cont home statin    #HTN  -cont home hydralazine, amlodipine      Plan of care discussed with ED physician    Christiane Cowan, DO    Supplementary Documentation:     The 21st Century Cures Act makes medical notes like these available to patients in the interest of transparency. Please be advised this is a medical document. Medical documents are intended to carry relevant information, facts as evident, and the clinical opinion of the practitioner. The medical note is intended as peer to peer communication and may appear blunt or direct. It is written in medical language and may contain abbreviations or verbiage that are unfamiliar.

## 2024-10-05 NOTE — PLAN OF CARE
Received pt at 0730.   Pt is A&Ox4, complaints of pain at neck, chest, back, & ABD- not new- MD aware. Pt is on room air, CPAP at night, lungs are clear/diminished, no coughing. NSR/ A paced. Continent of B&B, active bowel sounds, last BM 10-3. Non-pitting BLE edema. Per pt morphine & dilaudid not controlling pain well- hosp at bedside- adjusting medications. Pt updated with plan of care.     POC  - pain control  - muscle relaxant   - d dimer to rule out blood clot        Problem: Diabetes/Glucose Control  Goal: Glucose maintained within prescribed range  Description: INTERVENTIONS:  - Monitor Blood Glucose as ordered  - Assess for signs and symptoms of hyperglycemia and hypoglycemia  - Administer ordered medications to maintain glucose within target range  - Assess barriers to adequate nutritional intake and initiate nutrition consult as needed  - Instruct patient on self management of diabetes  Outcome: Progressing     Problem: Patient/Family Goals  Goal: Patient/Family Long Term Goal  Description: Patient's Long Term Goal: To go home  Stay out of hospital 10-5    Interventions:  - medication as prescribed  - testing as ordered  - doctors to see  - follow ups    - See additional Care Plan goals for specific interventions  Outcome: Progressing  Goal: Patient/Family Short Term Goal  Description: Patient's Short Term Goal: to be out of pain  Control pain/ talk with MD 10-5    Interventions:   - Medications as prescribed  - Doctors to see  - Testing as ordered  - PRN dilaudid, monitor pain- discuss with MD    - See additional Care Plan goals for specific interventions  Outcome: Progressing     Problem: RESPIRATORY - ADULT  Goal: Achieves optimal ventilation and oxygenation  Description: INTERVENTIONS:  - Assess for changes in respiratory status  - Assess for changes in mentation and behavior  - Position to facilitate oxygenation and minimize respiratory effort  - Oxygen supplementation based on oxygen saturation or  ABGs  - Provide Smoking Cessation handout, if applicable  - Encourage broncho-pulmonary hygiene including cough, deep breathe, Incentive Spirometry  - Assess the need for suctioning and perform as needed  - Assess and instruct to report SOB or any respiratory difficulty  - Respiratory Therapy support as indicated  - Manage/alleviate anxiety  - Monitor for signs/symptoms of CO2 retention  Outcome: Progressing     Problem: CARDIOVASCULAR - ADULT  Goal: Maintains optimal cardiac output and hemodynamic stability  Description: INTERVENTIONS:  - Monitor vital signs, rhythm, and trends  - Monitor for bleeding, hypotension and signs of decreased cardiac output  - Evaluate effectiveness of vasoactive medications to optimize hemodynamic stability  - Monitor arterial and/or venous puncture sites for bleeding and/or hematoma  - Assess quality of pulses, skin color and temperature  - Assess for signs of decreased coronary artery perfusion - ex. Angina  - Evaluate fluid balance, assess for edema, trend weights  Outcome: Progressing  Goal: Absence of cardiac arrhythmias or at baseline  Description: INTERVENTIONS:  - Continuous cardiac monitoring, monitor vital signs, obtain 12 lead EKG if indicated  - Evaluate effectiveness of antiarrhythmic and heart rate control medications as ordered  - Initiate emergency measures for life threatening arrhythmias  - Monitor electrolytes and administer replacement therapy as ordered  Outcome: Progressing

## 2024-10-05 NOTE — HISTORICAL OFFICE NOTE
[facility]  Outside Information  Continuity of Care Document  3/19/2024  Nadege Cruz - 68 y.o. Female; born [birth year]Summary of episode note, generated on Oct. 05, 2024October 05, 2024   CHIEF COMPLAINT    CHIEF COMPLAINT  Reason for Visit/Chief Complaint   Consult -  PET 3/6  TTE 3/15   New patient visit with Nadege for me. She is not new to our practice. She prefers to see doctors here to the [facility] as it is closer to where she lives in [place]. She is a diabetic who required a pacemaker in 2020 in [place]. She recently moved to the [place] area. She also has hypertension and hyperlipidemia. My partner  ordered a echocardiogram and stress test and she is here to discuss results. She has not had any further chest pain which was the indication for the testing.     PROBLEMS  Reconcile with Patient's ChartPROBLEMS  Problem Effective Dates Date resolved Problem Status   Dyslipidemia, [SNOMED-CT: 346328247] 2/2/2024 - Active   Obesity, diabetes, and hypertension syndrome, [SNOMED-CT: 90960659] 2/2/2024 - Active   Hypertension associated with diabetes, [SNOMED-CT: 79173952] 2/2/2024 - Active   Cardiac pacemaker (s/p PPM), [SNOMED-CT: 537873202] 9/11/2023 - Active   SOB (shortness of breath), [SNOMED-CT: 964708225] 9/11/2023 - Active   Chest pain, atypical, [SNOMED-CT: 636507374] 9/11/2023 - Active     ENCOUNTER DIAGNOSIS    ENCOUNTER DIAGNOSIS  Problem Effective Dates Date resolved Problem Status   Dyslipidemia, [SNOMED-CT: 969702008] 2/2/2024 - Active   Obesity, diabetes, and hypertension syndrome, [SNOMED-CT: 49077847] 2/2/2024 - Active   Hypertension associated with diabetes, [SNOMED-CT: 40859179] 2/2/2024 - Active   Anemia, mild, [SNOMED-CT: 881896397] 2/2/2024 - Active   Cardiac pacemaker (s/p PPM), [SNOMED-CT: 138840310] 9/11/2023 - Active   SOB (shortness of breath), [SNOMED-CT: 451824791] 9/11/2023 - Active     VITAL SIGNS    VITAL SIGNS  Date / Time:  3/20/2024   BP Systolic 140 mmHg   BP Diastolic 82 mmHg   Height 66 inches   Weight 223 lbs   Pulse Rate 78 bpm   BSA (Body Surface Area) 2.2 cc/m2   BMI (Body Mass Index) 36 cc/m2   Blood Pressure 140 / 82 mmHg     PHYSICAL EXAMINATION    PHYSICAL EXAMINATION  Header Details   Vitals Right Arm Sitting  / 82 mmHg, Pulse rate 78 bpm, Height in 5' 6\", BMI: 36, Weight in 223 lbs (or) 101.15 kgs, BSA : 2.21 cc/m²   General Appearance No Acute Distress   Cardiovascular s1, s2 reg,     ALLERGIES, ADVERSE REACTIONS, ALERTS  Reconcile with Patient's ChartALLERGIES, ADVERSE REACTIONS, ALERTS  Type Substance Reaction Severity Status   Allergies dye - Ingredient Heart attack Severe Active   Allergies Penicillins - Allergen Rash Severe Active   Allergies Sulfa (Sulfonamide Antibiotics) - Allergen Rash Severe Active   Allergies Vancomycin, [RxNorm: 91809]   Mild Active     MEDICATIONS ADMINISTERED DURING VISIT    No data available    MEDICATIONS  Reconcile with Patient's ChartMEDICATIONS  Medication Start Date Route/Frequency Status   aspirin 81 MG Oral Tab EC, [RxNorm: 299124] 9/11/2023 Take 1 tablet (81 mg total) by mouth daily. Active   Cholecalciferol (VITAMIN D3) 1.25 MG (11123 UT) Oral Cap, [RxNorm: 997455] 9/11/2023 Take 1 capsule by mouth once a week. Tuesday Active   docusate sodium 100 MG Oral Cap, [RxNorm: 7675153] 9/11/2023 Take 1 capsule by mouth 3 (three) times daily. Active   glipiZIDE 5 MG Oral Tab, [RxNorm: 756792] 9/11/2023 Take 8 mg by mouth every morning before breakfast. Active   HYDROcodone-acetaminophen  MG Oral Tab, [RxNorm: 195766] 9/11/2023 Take 1 tablet by mouth every 4 (four) hours as needed for Pain. Active   LINZESS 290 MCG Oral Cap, [RxNorm: 5905243] 9/11/2023 Take 1 capsule (290 mcg total) by mouth every morning. Active   loratadine 10 MG Oral Tab, [RxNorm: 107757] 9/11/2023 Take 1 tablet (10 mg total) by mouth daily. Active   metFORMIN HCl 1000 MG Oral Tab, [RxNorm: 316129] 9/11/2023  Take 1 tablet (1,000 mg total) by mouth 2 (two) times daily with meals. Active   Narcosoft 545 mg capsule, [RxNorm: 0] 9/11/2023 Take 1 capsule orally as needed Active   Norvasc 10 mg tablet, [RxNorm: 003923] 1/19/2024 Take 1 tablet orally once a day. Active   olopatadine 0.1 % Ophthalmic Solution, [RxNorm: 4621184] 9/11/2023 Place 1 drop into both eyes daily. For dry eyes Active   rosuvastatin 20 mg tablet, [RxNorm: 519314] 2/16/2024 Take 1 tablet orally once a day. Active   sennosides-docusate 8.6-50 MG Oral Tab, [RxNorm: 5943325] 9/11/2023 Take 2 tablets by mouth nightly. Active     ASSESSMENT    I discussed the results of her echo and stress testing. In particular normal perfusion stress test gives her less than 1% risk of cardiovascular events yearly for the next 5 years. This is an excellent result. I emphasized to her the importance of risk factor modification. Emphasized the importance of plant-based diet. Follow-up with me in 6 months with repeat fasting lipid profile and a CMP. If LDL above 70 at that point we will need to consider adding on top of statin new medication like Zetia or PCSK9 inhibitors.     FAMILY HISTORY    FAMILY HISTORY  Relationship Age Diagnosis   Father 0 Family history of heart disease   Mother 0 Family history of heart disease     GENERAL STATUS    No data available    PAST MEDICAL HISTORY    PAST MEDICAL HISTORY  Problem Date diagonsed Date resolved Status   Dyslipidemia, [SNOMED-CT: 077428166] 2/2/2024 - Active   Obesity, diabetes, and hypertension syndrome, [SNOMED-CT: 07226737] 2/2/2024 - Active   Hypertension associated with diabetes, [SNOMED-CT: 01018439] 2/2/2024 - Active   Cardiac pacemaker (s/p PPM), [SNOMED-CT: 105990792] 9/11/2023 - Active   SOB (shortness of breath), [SNOMED-CT: 438255239] 9/11/2023 - Active   Chest pain, atypical, [SNOMED-CT: 043557846] 9/11/2023 - Active     HISTORY OF PRESENT ILLNESS    New patient visit with Nadege for me. She is not new to our  practice. She prefers to see doctors here to the [facility] as it is closer to where she lives in [place]. She is a diabetic who required a pacemaker in 2020 in [place]. She recently moved to the [place] area. She also has hypertension and hyperlipidemia. My partner  ordered a echocardiogram and stress test and she is here to discuss results. She has not had any further chest pain which was the indication for the testing.     IMMUNIZATIONS    No data available    PLAN OF CARE    PLAN OF CARE  Planned Care Date   EKG (electrocardiogram) 1/1/1900   Comprehensive metabolic panel 1/1/1900   Lipid Panel_Fasting 1/1/1900   Follow up visit - Jarod Horner MD 1/1/1900     PROCEDURES    No data available    RESULTS    RESULTS  Name Result Date Location - Ordered By   FREE T4 [LOINC: 3024-7] 1.0 ng/dL 02/02/2024 11:48:00 AM [facility] ([facility])  Address: [address] [place] [address]  tel:   TSH [LOINC: 85721-2] 1.300 mIU/mL 02/02/2024 11:48:00 AM [facility] ([facility])  Address: [address] [place] [address]  tel:   PRO-BETA NATRIURETIC PEPTIDE [LOINC: 25246-0] 56 pg/mL 02/02/2024 11:48:00 AM [facility] ([facility])  Address: [address] [place] [address]  tel:   GLUCOSE [LOINC: 2339-0] 337 mg/dL [High] 02/02/2024 11:48:00 AM [facility] ([facility])  Address: [address] [place] [address]  tel:   SODIUM [LOINC: 2951-2] 136 mmol/L 02/02/2024 11:48:00 AM [facility] ([facility])  Address: [address] [place] [address]  tel:   POTASSIUM [LOINC: 2823-3] 4.1 mmol/L 02/02/2024 11:48:00 AM [facility] ([facility])  Address: [address] [place] [address]  [address]  tel:   CHLORIDE [LOINC: 2075-0] 106 mmol/L 02/02/2024 11:48:00 AM [facility] ([facility])  Address: [address] [place] [address]  [facility]  [address]  tel:   CO2 [LOINC: 2028-9] 27.0 mmol/L 02/02/2024 11:48:00 AM [facility] ([facility])  Address: [address] [place] [address]  [address]  tel:   ANION GAP [LOINC: 33037-3] 3 mmol/L 02/02/2024 11:48:00 AM [facility] ([facility])  Address: [address] [place] [address]  [address]  tel:   BUN [LOINC: 6299-2] 17 mg/dL 02/02/2024 11:48:00 AM [facility] ([facility])  Address: [address] [place] [address]  tel:   CREATININE [LOINC: 34168-5] 1.06 mg/dL [High] 02/02/2024 11:48:00 AM [facility] ([facility])  Address: [address] [place] [address]  tel:   CALCIUM [LOINC: 98538-6] 9.2 mg/dL 02/02/2024 11:48:00 AM [facility] ([facility])  Address: [address] [place] [address]  tel:   OSMOLALITY CALCULATED [LOINC: 54770-1] 297 mOsm/kg [High] 02/02/2024 11:48:00 AM [facility] ([facility])  Address: [address] [place] [address]  tel:   E GFR CR [LOINC: 36391-7] 58 mL/min/1.73m2 [Low] 02/02/2024 11:48:00 AM [facility] ([facility])  Address: [address] [place] [address]  tel:   AST [LOINC: 1920-8] 11 U/L [Low] 02/02/2024 11:48:00 AM [facility] ([facility])  Address: [address] [place] [address]  tel:   ALT [LOINC: 1742-6] 27 U/L 02/02/2024 11:48:00 AM [facility] ([facility])  Address: [address] [place] [address]  tel:   ALKALINE PHOSPHATASE [LOINC: 1779-8] 262 U/L [High] 02/02/2024 11:48:00 AM [facility] ([facility])  Address: [address] [place] [address]  tel:   BILIRUBIN, TOTAL [LOINC: 1975-2] 0.3 mg/dL 02/02/2024 11:48:00 AM [facility] ([facility])  Address: [address] [place] [address]  tel:   TOTAL PROTEIN [LOINC: 2885-2] 7.8  g/dL 02/02/2024 11:48:00 AM [facility] ([facility])  Address: [address] [place] [address]  [address]  tel:   ALBUMIN [LOINC: 1751-7] 3.7 g/dL 02/02/2024 11:48:00 AM [facility] ([facility])  Address: [address] [place] [address]  [address]  tel:   GLOBULIN [LOINC: 56036-2] 4.1 g/dL 02/02/2024 11:48:00 AM [facility] ([facility])  Address: [address] [place] [address]  [address]  tel:   A/G RATIO [LOINC: 1759-0] 0.9 [Low] 02/02/2024 11:48:00 AM [facility] ([facility])  Address: [address] [place] [address]  [address]  tel:   FASTING PATIENT CMP ANSWER [LOINC: 19956-5] Yes 02/02/2024 11:48:00 AM [facility] ([facility])  Address: [address] [place] [address]  [address]  tel:   IRON [LOINC: 2498-4] 79 ug/dL 02/02/2024 11:48:00 AM [facility] ([facility])  Address: [address] [place] [address]  [address]  tel:   TRANSFERRIN [LOINC: 3034-6] 241 mg/dL 02/02/2024 11:48:00 AM [facility] ([facility])  Address: [address] [place] [address]  [address]  tel:   TOTAL IRON BINDING CAPACITY [LOINC: 2500-7] 359 ug/dL 02/02/2024 11:48:00 AM [facility] ([facility])  Address: [address] [place] [address]  [address]  tel:   IRON SATURATION [LOINC: 2502-3] 22 % 02/02/2024 11:48:00 AM [facility] ([facility])  Address: [address] [place] [address]  [address]  tel:   CHOLESTEROL [LOINC: 2093-3] 232 mg/dL [High] 02/02/2024 11:48:00 AM [facility] ([facility])  Address: [address] [place] [address]  [address]  tel:   HDL CHOL [LOINC: 2085-9] 77 mg/dL [High] 02/02/2024 11:48:00 AM [facility] ([facility])  Address: [address] [place] [address]  [address]  tel:   TRIGLYCERIDES [LOINC: 2571-8] 74 mg/dL 02/02/2024 11:48:00 AM [facility] ([facility]  [facility])  Address: [address] [place] [address]  [address]  tel:   LDL CHOLESTROL [LOINC: 51019-8] 142 mg/dL [High] 02/02/2024 11:48:00 AM [facility] ([facility])  Address: [address] [place] [address]  [address]  tel:   VLDL [LOINC: 88786-9] 14 mg/dL 02/02/2024 11:48:00 AM [facility] ([facility])  Address: [address] [place] [address]  [address]  tel:   NON HDL CHOL [LOINC: 35988-4] 155 mg/dL [High] 02/02/2024 11:48:00 AM [facility] ([facility])  Address: [address] [place] [address]  tel:   FASTING PATIENT LIPID ANSWER [LOINC: 56727545] Yes 02/02/2024 11:48:00 AM [facility] ([facility])  Address: [address] [place] [address]  [address]  tel:   WBC [LOINC: 6690-2] 5.3 x10(3) uL 02/02/2024 11:48:00 AM [facility] ([facility])  Address: [address] [place] [address]  [address]  tel:   RED BLOOD COUNT [LOINC: 789-8] 4.15 x10(6)uL 02/02/2024 11:48:00 AM [facility] ([facility])  Address: [address] [place] [address]  [address]  tel:   HGB [LOINC: 718-7] 11.8 g/dL [Low] 02/02/2024 11:48:00 AM [facility] ([facility])  Address: [address] [place] [address]  [address]  tel:   HCT [LOINC: 4544-3] 34.5 % [Low] 02/02/2024 11:48:00 AM [facility] ([facility])  Address: [address] [place] [address]  [address]  tel:   PLATELETS [LOINC: 777-3] 265.0 10(3)uL 02/02/2024 11:48:00 AM [facility] ([facility])  Address: [address] [place] [address]  [address]  tel:   MEAN CELL VOLUME [LOINC: 787-2] 83.1 fL 02/02/2024 11:48:00 AM [facility] ([facility])  Address: [address] [place] [address]  [address]  tel:   MEAN CORPUSCULAR HEMOGLOBIN [LOINC: 785-6] 28.4 pg 02/02/2024 11:48:00 AM [facility] ([facility])  Address: [address] [place] [address]  [facility]  [address]  tel:   MEAN CORPUSCULAR HGB CONC [LOINC: 786-4] 34.2 g/dL 02/02/2024 11:48:00 AM [facility] ([facility])  Address: [address] [place] [address]  [address]  tel:   RED CELL DISTRIBUTION WIDTH CV [LOINC: 788-0] 13.8 % 02/02/2024 11:48:00 AM [facility] ([facility])  Address: [address] [place] [address]  [address]  tel:   NEUTROPHIL ABS PRELIM [LOINC: 751-8] 3.09 x10 (3) uL 02/02/2024 11:48:00 AM [facility] ([facility])  Address: [address] [place] [address]  tel:   NEUTROPHIL ABSOLUTE [LOINC: 751-8] 3.09 x10(3) uL 02/02/2024 11:48:00 AM [facility] ([facility])  Address: [address] [place] [address]  [address]  tel:   LYMPHOCYTE ABSOLUTE [LOINC: 731-0] 1.92 x10(3) uL 02/02/2024 11:48:00 AM [facility] ([facility])  Address: [address] [place] [address]  [address]  tel:   MONOCYTE ABSOLUTE [LOINC: 742-7] 0.25 x10(3) uL 02/02/2024 11:48:00 AM [facility] ([facility])  Address: [address] [place] [address]  [address]  tel:   EOSINOPHIL ABSOLUTE [LOINC: 711-2] 0.03 x10(3) uL 02/02/2024 11:48:00 AM [facility] ([facility])  Address: [address] [place] [address]  [address]  tel:   BASOPHIL ABSOLUTE [LOINC: 704-7] 0.01 x10(3) uL 02/02/2024 11:48:00 AM [facility] ([facility])  Address: [address] [place] [address]  [address]  tel:   IMMATURE GRANULOCYTE COUNT [LOINC: 44421-4] 0.01 x10(3) uL 02/02/2024 11:48:00 AM [facility] ([facility])  Address: [address] [place] [address]  tel:   NEUTROPHIL % [LOINC: 770-8] 58.1 % 02/02/2024 11:48:00 AM [facility] ([facility])  Address: [address] [place] [address]  tel:   LYMPHOCYTE % [LOINC: 736-9] 36.2 % 02/02/2024 11:48:00 AM [facility] ([facility]  [facility])  Address: [address] [place] [address]  tel:   MONOCYTE % [LOINC: 5905-5] 4.7 % 02/02/2024 11:48:00 AM [facility] ([facility])  Address: [address] [place] [address]  tel:   EOSINOPHIL % [LOINC: 713-8] 0.6 % 02/02/2024 11:48:00 AM [facility] ([facility])  Address: [address] [place] [address]  tel:   BASOPHIL % [LOINC: 706-2] 0.2 % 02/02/2024 11:48:00 AM [facility] ([facility])  Address: [address] [place] [address]  tel:   IMMATURE GRANULOCYTE RATIO % [LOINC: 52660-9] 0.2 % 02/02/2024 11:48:00 AM [facility] ([facility])  Address: [address] [place] [address]  tel:   Comprehensive metabolic panel [LOINC: 178607515424589] Pending 6 Months     Lipid Panel_Fasting [LOINC: 98658-3] Pending 6 Months     Nuclear PET 1.Stress EKG is non-diagnostic secondary to development of paced ventricular rhythm after Regadenoson.2.Pt denied chest pain.3.No significant ectopy.4.Final BP prior to d/c home was 162/76. 1.The study quality is good. 2.This is a normal perfusion study, no perfusion defects noted. There is no evidence of ischemia. 3.This scan is suggestive of low risk for future cardiovascular events. 4.The left ventricular cavity is noted to be mildly enlarged on the stress studies. The left ventricular ejection fraction was calculated to be 72% and left ventricular global function is normal. The rest left ventricular cavity is noted to be mildly enlarged. The rest left ventricular ejection fraction was calculated to be 69% and rest left ventricular global function is normal. 5.Compared with rest and stress studies the ejection fraction has increased from 69% to 72%. 3/6/2024 1:00:00 PM Jarod Horner MD   Trans Thoracic Echocardiogram 1.The study quality is average. 2.The left ventricle is normal in size and global left ventricular systolic function.  The left ventricular ejection fraction is 65%. The left ventricle diastolic function is impaired (Grade I) with normal left atrial pressure. No regional wall motion abnormalities.3.The right ventricle is normal in size. Right ventricular systolic function is normal. A linear echo density is noted in the right ventricle, suggestive of a pacemaker lead.4.The estimated pulmonary artery systolic pressure is 22 mmHg assuming a right atrial pressure of 3 mmHg. 5.Mild (1+) tricuspid regurgitation. 3/15/2024 11:00:00 AM Jarod Horner MD     REVIEW OF SYSTEMS    REVIEW OF SYSTEMS  Header Details   Cardiovascular No history of Chest pain, LARSON, Palpitations, Syncope, PND, Orthopnea, Edema, Claudication     SOCIAL HISTORY    SOCIAL HISTORY  Social History Element Description Effective Dates   Smoking status Never smoked -     FUNCTIONAL STATUS    No data available    MEDICAL EQUIPMENT    No data available    Goals Sections    No data available    REASON FOR REFERRAL    No data available    Health Concerns Section    No data available    COGNITIVE/MENTAL STATUS    No data available    Patient Demographics    Patient Demographics  Patient Address Patient Name Communication   [address] [place] [address] Nadege Cruz [phone] (Mobile)     Patient Demographics  Language Race / Ethnicity Marital Status   English - Spoken (Preferred) Unknown / Not  or  Single     Document Information    Primary Care Provider Other Service Providers Document Coverage Dates   Jarod Horner  NPI: [id number]  [phone] (Work)  [address] [place] [address]  [place] [address]  Interpreting Physicians  [facility] [place]  [phone] (Work)  [address] [place] [address] Patchasity Rai  NPI: [id number]  [phone] (Work)  [address] [place] [address]  [place] [address]  Nurses     Godwin Magallanes  NPI:  [id number]  [phone] (Work)  [address] [place] [address] [id number] [place] [address]  Nurses Mar. 20, 2024March 20, 2024      Organization   [facility]  [phone] (Work)  [address] [place] [address] [id number] [place] [address]     Encounter Providers Encounter Date    Mar. 20, 2024March 20, 2024     Legal Authenticator    Jarod Horner  NPI: [id number]  [phone] (Work)  [address] [place] [address]  [place] [address]

## 2024-10-05 NOTE — PROGRESS NOTES
Nadege Cruz Patient Status:  Emergency    10/14/1955 MRN [id number]   Location [facility] EMERGENCY DEPARTMENT Attending Curly Downs MD   Hosp Day # 0 PCP Kory Cleary MD     Cardiology Nocturnal APN Note    Briefly: (Documentation from chart review)     Nadege Cruz is a 67 y/o female who presented with chest pain and has a PMH/PSH of:       Past Medical History:    Arrhythmia    Cataract    Deep vein thrombosis (HCC)    Depression    Diabetes (HCC)    Esophageal reflux    High blood pressure    High cholesterol    Sleep apnea    Visual impairment       Primary Cardiologist Evens/Iain    Vital Signs:       10/4/2024    11:06 PM 10/5/2024    12:30 AM   Vitals History   /85 139/119   Pulse  60   Resp  16   Temp 97.7 °F (36.5 °C)    SpO2  96 %        Labs:   Lab Results   Component Value Date    WBC 7.0 10/04/2024    HGB 11.4 10/04/2024    HCT 33.2 10/04/2024    .0 10/04/2024    CREATSERUM 0.87 10/04/2024    BUN 13 10/04/2024     10/04/2024    K 4.4 10/04/2024     10/04/2024    CO2 27.0 10/04/2024     10/04/2024    CA 9.9 10/04/2024    ALB 4.0 10/04/2024    ALKPHO 171 10/04/2024    BILT 0.4 10/04/2024    TP 8.0 10/04/2024    AST 17 10/04/2024    ALT 16 10/04/2024    TROPHS 3 10/04/2024       Diagnostics:   XR CHEST AP PORTABLE  (CPT=71045)    Result Date: 10/4/2024  PROCEDURE:  XR CHEST AP PORTABLE  (CPT=71045)  TECHNIQUE:  AP chest radiograph was obtained.  COMPARISON:  None.  INDICATIONS:  CP  PATIENT STATED HISTORY: (As transcribed by Technologist)  Patient states left and central anterior chest pains radiating through to back x3 days, worsening. Hx Pacemaker, MI    FINDINGS:  Lung volumes are low.  There is increased density at left lung base which is probably atelectasis although pneumonia is not excluded.  Heart size is within normal limits.  Left-sided pacer device and electrodes are noted over right atrium and right ventricle.  Chest wall structures are  otherwise unremarkable.            CONCLUSION:  There are low lung volumes.  Dependent density left lower lung is most likely atelectasis although pneumonia is not excluded.   LOCATION:  [facility]      Dictated by (CST): Emil Bruce MD on 10/04/2024 at 11:45 PM     Finalized by (CST): Emil Bruce MD on 10/04/2024 at 11:45 PM         Allergies:  Allergies   Allergen Reactions    Radiology Contrast Iodinated Dyes CARDIAC ARREST    Sulfa Antibiotics HIVES    Vancomycin ITCHING    Corticosteroids NAUSEA ONLY     \"Passed out after taking them\"    Penicillins RASH       Medications:  No current facility-administered medications for this encounter.    Assessment:   Chest pain  - Troponin negative  - ECG without acute changes  - Recent SVT ablation  - Outpatient stress test 3/2024 normal perfusion      Plan:    - Continue to monitor overnight  - Formal Cardiology consult to follow in AM.       SHI Will  [facility]  10/5/2024  1:01 AM

## 2024-10-05 NOTE — ED QUICK NOTES
Orders for admission, patient is aware of plan and ready to go upstairs. Any questions, please call ED RN Rabia at extension [phone].     Patient Covid vaccination status: Unvaccinated     COVID Test Ordered in ED: None    COVID Suspicion at Admission: N/A    Running Infusions:  None    Mental Status/LOC at time of transport: A&Ox4    Other pertinent information:   CIWA score: N/A   NIH score:  N/A

## 2024-10-05 NOTE — ED PROVIDER NOTES
Patient Seen in: [facility] Emergency Department      History     Chief Complaint   Patient presents with    Chest Pain Angina     Stated Complaint:     Subjective:   HPI    The 68-year-old female patient presented with chest pain, which she described as a pressure sensation. She noticed her heart rate increasing at times, reaching up to 80 beats per minute, which she managed by trying to relax. The chest pain was accompanied by discomfort in her neck and back. The pain extended to her shoulder, down her arm, and into her back. The onset of the severe pain was around 6:00, although she had been experiencing milder symptoms before that. She also reported pain in her abdomen, which she differentiated from the chest pain. She had a recent cardiac ablation and was under the care of a cardiologist, ,. She had previously been treated at a heart hospital. The nitroglycerin provided some relief, but still states having some pain and does appear uncomfortable   Objective:     Past Medical History:    Arrhythmia    Cataract    Deep vein thrombosis (HCC)    Depression    Diabetes (HCC)    Esophageal reflux    High blood pressure    High cholesterol    Sleep apnea    Visual impairment              Past Surgical History:   Procedure Laterality Date    Cardiac pacemaker placement  2020    Cataract      Upper gi endoscopy,exam                  Social History     Socioeconomic History    Marital status: Single   Tobacco Use    Smoking status: Never    Smokeless tobacco: Never   Vaping Use    Vaping status: Never Used   Substance and Sexual Activity    Alcohol use: Never     Comment: Rarely    Drug use: Never     Social Determinants of Health     Food Insecurity: Food Insecurity Present (10/5/2024)    Food Insecurity     Food Insecurity: Sometimes true   Transportation Needs: Unmet Transportation Needs (10/5/2024)    Transportation Needs     Lack of Transportation: Yes    Received from [facility], [facility]    Social Connections   Housing Stability: Low Risk  (10/5/2024)    Housing Stability     Housing Instability: No                  Physical Exam     ED Triage Vitals   BP 10/04/24 2306 141/85   Pulse 10/04/24 2304 68   Resp 10/04/24 2304 18   Temp 10/04/24 2306 97.7 °F (36.5 °C)   Temp src 10/04/24 2306 Oral   SpO2 10/04/24 2304 100 %   O2 Device 10/04/24 2315 None (Room air)       Current Vitals:   Vital Signs  BP: 147/66  Pulse: 61  Resp: 18  Temp: 97.7 °F (36.5 °C)  Temp src: Oral  MAP (mmHg): 89    Oxygen Therapy  SpO2: 96 %  O2 Device: None (Room air)        Physical Exam    Vital signs reviewed  General appearance: Patient is alert and in moderate pain distress  HEENT: Pupils equal react to light extraocular muscles intact no scleral icterus, mucous membranes are moist, there is no erythema or exudate in the posterior pharynx  Neck: Supple no JVD no lymphadenopathy no meningismus no carotid bruit  CV: Regular rate and rhythm no murmur rub chest wall is tender to touch  Respiratory: Clear to auscultation bilaterally no crackles no wheezes no accessory muscle use  Abdomen: Patient does have some left upper quadrant tenderness no rebound no guarding  no hepatosplenomegaly bowel sounds are present , no pulsatile mass  Extremities: No clubbing cyanosis or edema 2+ distal pulses.  Neuro: Cranial nerves II through XII intact with no gross focal sensory or motor abnormality.      ED Course     Labs Reviewed   COMP METABOLIC PANEL (14) - Abnormal; Notable for the following components:       Result Value    Glucose 142 (*)     Alkaline Phosphatase 171 (*)     Globulin  4.0 (*)     All other components within normal limits   CBC WITH DIFFERENTIAL WITH PLATELET - Abnormal; Notable for the following components:    HGB 11.4 (*)     HCT 33.2 (*)     All other components within normal limits   TROPONIN I HIGH SENSITIVITY - Normal   BASIC METABOLIC PANEL (8)   TROPONIN I HIGH SENSITIVITY    TROPONIN I HIGH SENSITIVITY   URINALYSIS WITH CULTURE REFLEX   RAINBOW DRAW BLUE     EKG    Rate, intervals and axes as noted on EKG Report.  Rate: 67  Rhythm: Sinus Rhythm  Reading: Sinus rhythm with first-degree AV block            Patient was evaluated emergency department had a CBC chemistry troponin will have a chest x-ray done.  Will give her some morphine for pain as she does appear quite uncomfortable she had already received aspirin.  Also give her 1 more sublingual nitroglycerin    XR CHEST AP PORTABLE  (CPT=71045)    Result Date: 10/4/2024  CONCLUSION:  There are low lung volumes.  Dependent density left lower lung is most likely atelectasis although pneumonia is not excluded.   LOCATION:  [facility]      Dictated by (CST): Emil Bruce MD on 10/04/2024 at 11:45 PM     Finalized by (CST): Emil Bruce MD on 10/04/2024 at 11:45 PM         Patient's symptoms are concerning for cardiac however cardiac enzymes are negative.  She has had visits for similar issues.  Patient will be admitted as I talked with cardiology and they did not feel comfortable sending her home.  She was given couple doses of morphine and seem to be resting comfortably.  Unsure what is causing her pain but she did have a recent ablation so pericarditis could be an option  MDM      Differential diagnosis reflecting the complexity of care include: Cardiac angina, costochondritis, pleuritic chest pain, arrhythmia, pericarditis    Comorbidities that add complexity to management include: Hypertension, diabetes, high cholesterol, arrhythmia    External chart review was done and was noted: Cardiac note from 9/23/2024 was reviewed and patient had an ablation for SVT.  Tolerated well.      Discussions of management was done with: Neurology and the hospitalist were contacted and agree with the plan    My independent interpretation of studies of: Lung volumes were noted some questional atelectasis in the left base no enlarged cardiac  silhouette      Shared decision making was done by self patient cardiology and the hospitalist.  Patient will be admitted for pain control and further workup                    Admission disposition: 10/5/2024 12:44 AM           Medical Decision Making      Disposition and Plan     Clinical Impression:  1. Chest pain with high risk for cardiac etiology    2. Anemia, unspecified type         Disposition:  Admit  10/5/2024 12:44 am    Follow-up:  No follow-up provider specified.        Medications Prescribed:  Current Discharge Medication List              Supplementary Documentation:         Hospital Problems       Present on Admission             ICD-10-CM Noted POA    * (Principal) Chest pain with high risk for cardiac etiology R07.9 10/5/2024 Unknown    Anemia, unspecified type D64.9 10/5/2024 Unknown

## 2024-10-05 NOTE — PHYSICAL THERAPY NOTE
PT orders received, chart reviewed.  Discussed with RN, pt pending lung perfusion scan r/o PE, and is currently needing pain managed.  Will hold PT and attempt when pt is appropriate.  RN aware and in agreement.

## 2024-10-05 NOTE — HISTORICAL OFFICE NOTE
[facility]  Outside Information  Continuity of Care Document  9/16/2024  Nadege Cruz - 68 y.o. Female; born [birth year]Summary of episode note, generated on Oct. 05, 2024October 05, 2024   CHIEF COMPLAINT    CHIEF COMPLAINT  Reason for Visit/Chief Complaint   1 month follow up   68-year-old female here for establishing care for permanent pacemaker. Patient was seeing one of our cardiologist at [place] and then moved her self to [place] because she is living closer to [place]. She had a pacemaker in 2020 in [place].The device is shown 79 episodes of likely SVT. There is one-to-one VA conduction with a short VA time that suggest SVT rather than VT.Diagnostic echo showed a normal EF. Eve PET showed no perfusion abnormalitiesVisit July 2 , 2024  Patient still having elevations in heart rate. They occur after she is eating spicy foods. Has acid reflux. Was not taking the acid suppressant before mealsVisit Aug 5, 2024  Patient is had more episodes of SVT  The episodes have a one-to-one VA conduction and seem to terminate with V. This is consistent with either SVT or an atrial tachycardiaVisit September 17, 2024  Apparently someone from the hospital told her she needed a CAT scan which made her very nervous and worried  Scheduled for catheter ablation next Monday     PROBLEMS  Reconcile with Patient's ChartPROBLEMS  Problem Effective Dates Date resolved Problem Status   Dyslipidemia, [SNOMED-CT: 398138425] 2/2/2024 - Active   Obesity, diabetes, and hypertension syndrome, [SNOMED-CT: 16799984] 2/2/2024 - Active   Hypertension associated with diabetes, [SNOMED-CT: 25874268] 2/2/2024 - Active   Anemia, mild, [SNOMED-CT: 077987363] 2/2/2024 - Active   Cardiac pacemaker (s/p PPM), [SNOMED-CT: 588306659] 9/11/2023 - Active   SOB (shortness of breath), [SNOMED-CT: 444226515] 9/11/2023 - Active   Strain of left shoulder, subsequent encounter, [SNOMED-CT: 260114616] 9/11/2023 -  Active   Chest pain, atypical, [SNOMED-CT: 201182019] 9/11/2023 - Active     ENCOUNTER    ENCOUNTER  Problem Effective Dates Date resolved Problem Status   Dyslipidemia, [SNOMED-CT: 690567204] 2/2/2024 - Active   Obesity, diabetes, and hypertension syndrome, [SNOMED-CT: 94745690] 2/2/2024 - Active   Hypertension associated with diabetes, [SNOMED-CT: 91326358] 2/2/2024 - Active   Anemia, mild, [SNOMED-CT: 235258068] 2/2/2024 - Active   Cardiac pacemaker (s/p PPM), [SNOMED-CT: 883984972] 9/11/2023 - Active   SOB (shortness of breath), [SNOMED-CT: 642416989] 9/11/2023 - Active   Strain of left shoulder, subsequent encounter, [SNOMED-CT: 784555415] 9/11/2023 - Active   Chest pain, atypical, [SNOMED-CT: 389986243] 9/11/2023 - Active     VITAL SIGNS    VITAL SIGNS  Date / Time: 9/17/2024   BP Systolic 138 mmHg   BP Diastolic 60 mmHg   Height 66 inches   Weight 232 lbs   Pulse Rate 65 bpm   BSA (Body Surface Area) 2.3 cc/m2   BMI (Body Mass Index) 37.4 cc/m2   Blood Pressure 138 / 60 mmHg     PHYSICAL EXAMINATION    PHYSICAL EXAMINATION  Header Details   Vitals Right Arm Sitting  / 60 mmHg, Pulse rate 65 bpm, Height in 5' 6\", BMI: 37.4, Weight in 231.99 lbs (or) 105.23 kgs, BSA : 2.26 cc/m²   General Appearance No Acute Distress, Well groomed, Normal Body habitus   Cardiovascular      ALLERGIES, ADVERSE REACTIONS, ALERTS  Reconcile with Patient's ChartALLERGIES, ADVERSE REACTIONS, ALERTS  Type Substance Reaction Severity Status   Allergies dye - Ingredient Heart attack Severe Active   Allergies Penicillins - Allergen Rash Severe Active   Allergies Sulfa (Sulfonamide Antibiotics) - Allergen Rash Severe Active   Allergies Vancomycin, [RxNorm: 18123]   Mild Active     MEDICATIONS ADMINISTERED DURING VISIT    No data available    MEDICATIONS  Reconcile with Patient's ChartMEDICATIONS  Medication Start Date Route/Frequency Status   aspirin 81 MG Oral Tab EC, [RxNorm: 319922] 9/11/2023 Take 1 tablet (81 mg total) by mouth  daily. Active   Cholecalciferol (VITAMIN D3) 1.25 MG (29472 UT) Oral Cap, [RxNorm: 443202] 9/11/2023 Take 1 capsule by mouth once a week. Tuesday Active   docusate sodium 100 MG Oral Cap, [RxNorm: 2989454] 9/11/2023 Take 1 capsule by mouth 3 (three) times daily. Active   Eliquis 5 mg tablet, [RxNorm: 1165971] 8/5/2024 Take 1 tablet orally 2 times a day. Active   glipiZIDE 5 MG Oral Tab, [RxNorm: 226126] 9/11/2023 Take 8 mg by mouth every morning before breakfast. Active   HYDROcodone-acetaminophen  MG Oral Tab, [RxNorm: 286491] 9/11/2023 Take 1 tablet by mouth every 4 (four) hours as needed for Pain. Active   LINZESS 290 MCG Oral Cap, [RxNorm: 1023232] 9/11/2023 Take 1 capsule (290 mcg total) by mouth every morning. Active   metFORMIN HCl 1000 MG Oral Tab, [RxNorm: 691903] 9/11/2023 Take 1 tablet (1,000 mg total) by mouth 2 (two) times daily with meals. Active   metoprolol succinate ER 50 mg tablet,extended release 24 hr, [RxNorm: 051918] 8/5/2024 Take 1 tablet orally 2 times a day. Active   Narcosoft 545 mg capsule, [RxNorm: 0] 9/11/2023 Take 1 capsule orally as needed Active   olopatadine 0.1 % Ophthalmic Solution, [RxNorm: 2842378] 9/11/2023 Place 1 drop into both eyes daily. For dry eyes Active   rosuvastatin 20 mg tablet, [RxNorm: 895370] 2/16/2024 Take 1 tablet orally once a day. Active     ASSESSMENT    ===============================  Cardiac Testing Personally ReviewedECG Reviewed -atrial paced narrow QRSEcho Results EF 65% March 2024Stress Test Results Eve PET March 2024 normal perfusionMonitor Results device check showed 79 episodes of SVT. One-to-one VA conduction likely SVT with retrograde conductionEP Procedures: []  ==============================Problem List:# Device detected atrial fibrillation  - Now on Eliquis and metoprolol  - No further episodes since April however she still has a sensation that her heart rate is elevated often triggered by GI issues.  #Episodes of likely SVT  - Patient  would prefer catheter ablation  - Increase metoprolol to 50 twice daily  - Catheter ablation will hold metoprolol for 5 days and Eliquis for 48 hours before the procedure. Will do it with anesthesia given how nervous she is # Dual-chamber permanent pacemaker likely for sinus node dysfunction-patient has a Medtronic device 9.8 years of battery life # [] # [] # []  Continue current meds except as outlined above.  =====================================Check out Items:  Follow-up 1 month     FAMILY HISTORY    FAMILY HISTORY  Relationship Age Diagnosis   Father 0 Family history of heart disease   Mother 0 Family history of heart disease     GENERAL STATUS    No data available    PAST MEDICAL HISTORY    PAST MEDICAL HISTORY  Problem Date diagonsed Date resolved Status   Dyslipidemia, [SNOMED-CT: 062846725] 2/2/2024 - Active   Obesity, diabetes, and hypertension syndrome, [SNOMED-CT: 60044687] 2/2/2024 - Active   Hypertension associated with diabetes, [SNOMED-CT: 17997899] 2/2/2024 - Active   Anemia, mild, [SNOMED-CT: 808632453] 2/2/2024 - Active   Cardiac pacemaker (s/p PPM), [SNOMED-CT: 727849696] 9/11/2023 - Active   SOB (shortness of breath), [SNOMED-CT: 728478406] 9/11/2023 - Active   Strain of left shoulder, subsequent encounter, [SNOMED-CT: 354888663] 9/11/2023 - Active   Chest pain, atypical, [SNOMED-CT: 800553368] 9/11/2023 - Active     HISTORY OF PRESENT ILLNESS    68-year-old female here for establishing care for permanent pacemaker. Patient was seeing one of our cardiologist at [place] and then moved her self to [place] because [place] closer [place]. She had a pacemaker in 2020 in [place].The device is shown 79 episodes of likely SVT. There is one-to-one VA conduction with a short VA time that suggest SVT rather than VT.Diagnostic echo showed a normal EF. Eve PET showed no perfusion abnormalitiesVisit July 2 , 2024  Patient still having elevations in heart rate. They occur after she is  eating spicy foods. Has acid reflux. Was not taking the acid suppressant before mealsVisit Aug 5, 2024  Patient is had more episodes of SVT  The episodes have a one-to-one VA conduction and seem to terminate with V. This is consistent with either SVT or an atrial tachycardiaVisit September 17, 2024  Apparently someone from the hospital told her she needed a CAT scan which made her very nervous and worried  Scheduled for catheter ablation next Monday     IMMUNIZATIONS    No data available    PLAN OF CARE    PLAN OF CARE  Planned Care Date   EKG (electrocardiogram) 1/1/1900   Referral Visit - Kory Cleary ([email or web address]) : 1/1/1900     PROCEDURES    No data available    RESULTS    RESULTS  Name Result Date Location - Ordered By   FREE T4 [LOINC: 3024-7] 1.0 ng/dL 02/02/2024 11:48:00 AM [facility] ([facility])  Address: [address] [place] [address]  tel:   TSH [LOINC: 90921-1] 1.300 mIU/mL 02/02/2024 11:48:00 AM [facility] ([facility])  Address: [address] [place] [address]  tel:   PRO-BETA NATRIURETIC PEPTIDE [LOINC: 70294-7] 56 pg/mL 02/02/2024 11:48:00 AM [facility] ([facility])  Address: [address] [place] [address]  [address]  tel:   GLUCOSE [LOINC: 2339-0] 337 mg/dL [High] 02/02/2024 11:48:00 AM [facility] ([facility])  Address: [address] [place] [address]  [address]  tel:   SODIUM [LOINC: 2951-2] 136 mmol/L 02/02/2024 11:48:00 AM [facility] ([facility])  Address: [address] [place] [address]  tel:   POTASSIUM [LOINC: 2823-3] 4.1 mmol/L 02/02/2024 11:48:00 AM [facility] ([facility])  Address: [address] [place] [address]  tel:   CHLORIDE [LOINC: 2075-0] 106 mmol/L 02/02/2024 11:48:00 AM [facility] ([facility])  Address: [address] [place] [address]  tel:   CO2  [LOINC: 2028-9] 27.0 mmol/L 02/02/2024 11:48:00 AM [facility] ([facility])  Address: [address] [place] [address]  [address]  tel:   ANION GAP [LOINC: 33037-3] 3 mmol/L 02/02/2024 11:48:00 AM [facility] ([facility])  Address: [address] [place] [address]  [address]  tel:   BUN [LOINC: 6299-2] 17 mg/dL 02/02/2024 11:48:00 AM [facility] ([facility])  Address: [address] [place] [address]  tel:   CREATININE [LOINC: 92205-0] 1.06 mg/dL [High] 02/02/2024 11:48:00 AM [facility] ([facility])  Address: [address] [place] [address]  tel:   CALCIUM [LOINC: 00486-3] 9.2 mg/dL 02/02/2024 11:48:00 AM [facility] ([facility])  Address: [address] [place] [address]  [address]  tel:   OSMOLALITY CALCULATED [LOINC: 00850-4] 297 mOsm/kg [High] 02/02/2024 11:48:00 AM [facility] ([facility])  Address: [address] [place] [address]  [address]  tel:   E GFR CR [LOINC: 43493-8] 58 mL/min/1.73m2 [Low] 02/02/2024 11:48:00 AM [facility] ([facility])  Address: [address] [place] [address]  [address]  tel:   AST [LOINC: 1920-8] 11 U/L [Low] 02/02/2024 11:48:00 AM [facility] ([facility])  Address: [address] [place] [address]  tel:   ALT [LOINC: 1742-6] 27 U/L 02/02/2024 11:48:00 AM [facility] ([facility])  Address: [address] [place] [address]  [address]  tel:   ALKALINE PHOSPHATASE [LOINC: 1779-8] 262 U/L [High] 02/02/2024 11:48:00 AM [facility] ([facility])  Address: [address] [place] [address]  [address]  tel:   BILIRUBIN, TOTAL [LOINC: 1975-2] 0.3 mg/dL 02/02/2024 11:48:00 AM [facility] ([facility])  Address: [address] [place] [address]  [address]  tel:   TOTAL PROTEIN [LOINC: 2885-2] 7.8 g/dL 02/02/2024 11:48:00 AM [facility] ([facility])  Address: [address] [place] [address]  [address]  tel:   ALBUMIN [LOINC: 1751-7] 3.7 g/dL 02/02/2024 11:48:00 AM [facility] ([facility])  Address: [address] [place] [address]  [address]  tel:   GLOBULIN [LOINC: 76823-9] 4.1 g/dL 02/02/2024 11:48:00 AM [facility] ([facility])  Address: [address] [place] [address]  [address]  tel:   A/G RATIO [LOINC: 1759-0] 0.9 [Low] 02/02/2024 11:48:00 AM [facility] ([facility])  Address: [address] [place] [address]  [address]  tel:   FASTING PATIENT CMP ANSWER [LOINC: 58555-4] Yes 02/02/2024 11:48:00 AM [facility] ([facility])  Address: [address] [place] [address]  [address]  tel:   IRON [LOINC: 2498-4] 79 ug/dL 02/02/2024 11:48:00 AM [facility] ([facility])  Address: [address] [place] [address]  [address]  tel:   TRANSFERRIN [LOINC: 3034-6] 241 mg/dL 02/02/2024 11:48:00 AM [facility] ([facility])  Address: [address] [place] [address]  [address]  tel:   TOTAL IRON BINDING CAPACITY [LOINC: 2500-7] 359 ug/dL 02/02/2024 11:48:00 AM [facility] ([facility])  Address: [address] [place] [address]  [address]  tel:   IRON SATURATION [LOINC: 2502-3] 22 % 02/02/2024 11:48:00 AM [facility] ([facility])  Address: [address] [place] [address]  [address]  tel:   CHOLESTEROL [LOINC: 2093-3] 232 mg/dL [High] 02/02/2024 11:48:00 AM [facility] ([facility])  Address: [address] [place] [address]  [address]  tel:   HDL CHOL [LOINC: 2085-9] 77 mg/dL [High] 02/02/2024 11:48:00 AM [facility] ([facility])  Address: [address] [place] [address]  [address]  tel:   TRIGLYCERIDES [LOINC: 2571-8] 74 mg/dL 02/02/2024 11:48:00 AM [facility] ([facility])  Address: [address] [place] [address]  [facility]  [address]  tel:   LDL CHOLESTROL [LOINC: 16206-2] 142 mg/dL [High] 02/02/2024 11:48:00 AM [facility] ([facility])  Address: [address] [place] [address]  [address]  tel:   VLDL [LOINC: 48814-7] 14 mg/dL 02/02/2024 11:48:00 AM [facility] ([facility])  Address: [address] [place] [address]  [address]  tel:   NON HDL CHOL [LOINC: 29331-5] 155 mg/dL [High] 02/02/2024 11:48:00 AM [facility] ([facility])  Address: [address] [place] [address]  [address]  tel:   FASTING PATIENT LIPID ANSWER [LOINC: 49263578] Yes 02/02/2024 11:48:00 AM [facility] ([facility])  Address: [address] [place] [address]  [address]  tel:   WBC [LOINC: 6690-2] 5.3 x10(3) uL 02/02/2024 11:48:00 AM [facility] ([facility])  Address: [address] [place] [address]  [address]  tel:   RED BLOOD COUNT [LOINC: 789-8] 4.15 x10(6)uL 02/02/2024 11:48:00 AM [facility] ([facility])  Address: [address] [place] [address]  [address]  tel:   HGB [LOINC: 718-7] 11.8 g/dL [Low] 02/02/2024 11:48:00 AM [facility] ([facility])  Address: [address] [place] [address]  [address]  tel:   HCT [LOINC: 4544-3] 34.5 % [Low] 02/02/2024 11:48:00 AM [facility] ([facility])  Address: [address] [place] [address]  [address]  tel:   PLATELETS [LOINC: 777-3] 265.0 10(3)uL 02/02/2024 11:48:00 AM [facility] ([facility])  Address: [address] [place] [address]  [address]  tel:   MEAN CELL VOLUME [LOINC: 787-2] 83.1 fL 02/02/2024 11:48:00 AM [facility] ([facility])  Address: [address] [place] [address]  tel:   MEAN CORPUSCULAR HEMOGLOBIN [LOINC: 785-6] 28.4 pg 02/02/2024 11:48:00 AM [facility] ([facility])  Address: [address] [place] [address]  [address]  tel:   MEAN CORPUSCULAR  HGB CONC [LOINC: 786-4] 34.2 g/dL 02/02/2024 11:48:00 AM [facility]  Address: [address] [place] [address]  [address]  tel:   RED CELL DISTRIBUTION WIDTH CV [LOINC: 788-0] 13.8 % 02/02/2024 11:48:00 AM [facility] ([facility])  Address: [address] [place] [address]  [address]  tel:   NEUTROPHIL ABS PRELIM [LOINC: 751-8] 3.09 x10 (3) uL 02/02/2024 11:48:00 AM [facility] ([facility])  Address: [address] [place] [address]  [address]  tel:   NEUTROPHIL ABSOLUTE [LOINC: 751-8] 3.09 x10(3) uL 02/02/2024 11:48:00 AM [facility] ([facility])  Address: [address] [place] [address]  [address]  tel:   LYMPHOCYTE ABSOLUTE [LOINC: 731-0] 1.92 x10(3) uL 02/02/2024 11:48:00 AM [facility] ([facility])  Address: [address] [place] [address]  [address]  tel:   MONOCYTE ABSOLUTE [LOINC: 742-7] 0.25 x10(3) uL 02/02/2024 11:48:00 AM [facility] ([facility])  Address: [address] [place] [address]  [address]  tel:   EOSINOPHIL ABSOLUTE [LOINC: 711-2] 0.03 x10(3) uL 02/02/2024 11:48:00 AM [facility])  Address: [address] [place] [address]  [address]  tel:   BASOPHIL ABSOLUTE [LOINC: 704-7] 0.01 x10(3) uL 02/02/2024 11:48:00 AM [facility] ([facility])  Address: [address] [place] [address]  [address]  tel:   IMMATURE GRANULOCYTE COUNT [LOINC: 90609-2] 0.01 x10(3) uL 02/02/2024 11:48:00 AM [facility] ([facility])  Address: [address] [place] [address]  [address]  tel:   NEUTROPHIL % [LOINC: 770-8] 58.1 % 02/02/2024 11:48:00 AM [facility] ([facility])  Address: [address] [place] [address]  [address]  tel:   LYMPHOCYTE % [LOINC: 736-9] 36.2 % 02/02/2024 11:48:00 AM [facility])  Address: [address] [place] [address]  [place] [address]  tel:   MONOCYTE % [LOINC: 5905-5] 4.7 % 02/02/2024 11:48:00 AM [facility] ([facility])  Address: [address] [place] [address]  tel:   EOSINOPHIL % [LOINC: 713-8] 0.6 % 02/02/2024 11:48:00 AM [facility] ([facility])  Address: [address] [place] [address]  tel:   BASOPHIL % [LOINC: 706-2] 0.2 % 02/02/2024 11:48:00 AM [facility] ([facility])  Address: [address] [place] [address]  tel:   IMMATURE GRANULOCYTE RATIO % [LOINC: 56485-7] 0.2 % 02/02/2024 11:48:00 AM [facility] ([facility])  Address: [address] [place] [address]  tel:   Nuclear PET 1.Stress EKG is non-diagnostic secondary to development of paced ventricular rhythm after Regadenoson.2.Pt denied chest pain.3.No significant ectopy.4.Final BP prior to d/c home was 162/76. 1.The study quality is good. 2.This is a normal perfusion study, no perfusion defects noted. There is no evidence of ischemia. 3.This scan is suggestive of low risk for future cardiovascular events. 4.The left ventricular cavity is noted to be mildly enlarged on the stress studies. The left ventricular ejection fraction was calculated to be 72% and left ventricular global function is normal. The rest left ventricular cavity is noted to be mildly enlarged. The rest left ventricular ejection fraction was calculated to be 69% and rest left ventricular global function is normal. 5.Compared with rest and stress studies the ejection fraction has increased from 69% to 72%. 3/6/2024 1:00:00 PM Jarod Horner MD   Trans Thoracic Echocardiogram 1.The study quality is average. 2.The left ventricle is normal in size and global left ventricular systolic function. The left ventricular ejection fraction is 65%. The left ventricle diastolic function is impaired (Grade I) with normal left atrial pressure. No regional wall motion  abnormalities.3.The right ventricle is normal in size. Right ventricular systolic function is normal. A linear echo density is noted in the right ventricle, suggestive of a pacemaker lead.4.The estimated pulmonary artery systolic pressure is 22 mmHg assuming a right atrial pressure of 3 mmHg. 5.Mild (1+) tricuspid regurgitation. 3/15/2024 11:00:00 AM Jarod Horner MD     REVIEW OF SYSTEMS    REVIEW OF SYSTEMS  Header Details   Cardiovascular No history of Chest pain, LARSON, Palpitations, Syncope, PND, Orthopnea, Edema, Claudication     SOCIAL HISTORY    SOCIAL HISTORY  Social History Element Description Effective Dates   Smoking status Never smoked -     FUNCTIONAL STATUS    No data available    MEDICAL EQUIPMENT    No data available    Goals Sections    No data available    REASON FOR REFERRAL             Health Concerns Section    No data available    COGNITIVE/MENTAL STATUS    No data available    Patient Demographics    Patient Demographics  Patient Address Patient Name Communication   [address] [place] [address] Nadege Cruz [phone] (Mobile)     Patient Demographics  Language Race / Ethnicity Marital Status   English - Spoken (Preferred) Unknown / Not  or  Single     Document Information    Primary Care Provider Other Service Providers Document Coverage Dates   Cristopher Timmons  NPI: [id number]  [phone] (Work)  [address] [place] [address] [id number] [place] [address]  Interpreting Physicians  [facility]  [phone] (Work)  [address] [place] [address] Ruthann DEE White  NPI: [id number]  [phone] (Work)  [address] [place] [address]  [place] [address]  Nurses     Hiral Calhoun  NPI: [id number]  [phone] (Work)  [address] [place] [address]  [place] [address]  Nurses     Devika Hearn  NPI:  [id number]  [phone] (Work)  [address] [place] [address]  [place] [address]  Nurses Sep. 17, 2024September 17, 2024      Organization   [facility]  [phone] (Work)  [address] [place] [address] [id number] [place] [address]     Encounter Providers Encounter Date    Sep. 17, 2024September 17, 2024     Legal Authenticator    Cristopher Timmons  NPI: [id number]  [phone] (Work)  [address] [place] [address]  [place] [address]

## 2024-10-06 VITALS
RESPIRATION RATE: 18 BRPM | OXYGEN SATURATION: 96 % | BODY MASS INDEX: 36 KG/M2 | WEIGHT: 220 LBS | SYSTOLIC BLOOD PRESSURE: 129 MMHG | HEART RATE: 65 BPM | TEMPERATURE: 99 F | DIASTOLIC BLOOD PRESSURE: 64 MMHG

## 2024-10-06 LAB
GLUCOSE BLD-MCNC: 137 MG/DL (ref 70–99)
GLUCOSE BLD-MCNC: 150 MG/DL (ref 70–99)

## 2024-10-06 PROCEDURE — 99239 HOSP IP/OBS DSCHRG MGMT >30: CPT | Performed by: HOSPITALIST

## 2024-10-06 RX ORDER — ACETAMINOPHEN 500 MG
500 TABLET ORAL EVERY 6 HOURS PRN
Status: DISCONTINUED | OUTPATIENT
Start: 2024-10-06 | End: 2024-10-06

## 2024-10-06 RX ORDER — HYDROCODONE BITARTRATE AND ACETAMINOPHEN 10; 325 MG/1; MG/1
1 TABLET ORAL EVERY 4 HOURS PRN
Status: DISCONTINUED | OUTPATIENT
Start: 2024-10-06 | End: 2024-10-06

## 2024-10-06 RX ORDER — ACETAMINOPHEN 500 MG
1000 TABLET ORAL EVERY 6 HOURS PRN
Status: DISCONTINUED | OUTPATIENT
Start: 2024-10-06 | End: 2024-10-06

## 2024-10-06 NOTE — DISCHARGE INSTRUCTIONS
Visit [facility].org--enter zip code--should be able to provide any services available in your area    Housing/ Utilities   CHAZ: [facility]'s Energy Assistance number is [phone], or [phone] or email [email or web address].   [facility]’s Resource [employer] ([facility]) Apply for emergency rent and mortgage assistance.    Website: [email or web address]   [facility] ([facility] only) Apply for rent assistance. Phone: [phone]  Website: [email or web address]   [facility] - Phone: [phone]  [facility] - Phone: [phone]   [facility] ([facility]) Assistance with housing   Phone: [phone]   Email: [email or web address]   [facility]   [address] [place] [address] [phone]   [address] [address] [phone]

## 2024-10-06 NOTE — PHYSICAL THERAPY NOTE
PHYSICAL THERAPY EVALUATION - INPATIENT     Room Number: 2609/2609-A  Evaluation Date: 10/6/2024  Type of Evaluation: Initial  Physician Order: PT Eval and Treat    Presenting Problem: chest pain  Co-Morbidities : DVT, DM, HTN, AMITA, anxiety/depression  Reason for Therapy: Mobility Dysfunction and Discharge Planning    PHYSICAL THERAPY ASSESSMENT   Patient is a 68 year old female admitted 10/4/2024 for chest pain and c/o diffuse body pain.  Prior to admission, patient's baseline is mod I with use of rollator walker.  Patient is currently functioning below baseline with transfers, gait, stair negotiation, and standing prolonged periods.  Patient is requiring contact guard assist as a result of the following impairments: decreased endurance/aerobic capacity, pain, impaired standing balance, and decreased muscular endurance.  Physical Therapy will continue to follow for duration of hospitalization.    Patient will benefit from continued skilled PT Services at discharge to promote prior level of function and safety with additional support and return home with home health PT.    PLAN DURING HOSPITALIZATION  Nursing Mobility Recommendation : 1 Assist (sba)  PT Device Recommendation: Rollator  PT Treatment Plan: Bed mobility;Body mechanics;Endurance;Energy conservation;Patient education;Gait training;Range of motion;Strengthening;Stair training;Transfer training;Balance training  Rehab Potential : Good  Frequency (Obs): 3-5x/week     CURRENT GOALS    Goal #1 Patient will negotiate one flight of stairs with railing with min assist.     Goal #2 Patient is able to demonstrate transfers EOB to/from Chair/Wheelchair at assistance level: modified independent     Goal #3 Patient is able to ambulate 75 feet with assist device: walker - rolling at assistance level: supervision     Goal #4    Goal #5    Goal #6    Goal Comments: Goals established on 10/6/2024      PHYSICAL THERAPY MEDICAL/SOCIAL HISTORY  History related to current  admission: Patient is a 68 year old female admitted on 10/4/2024 from home for chest pain.  Pt diagnosed with chest pain, atypical.  Noted normal V/Q scan per imaging report 10/5/24.      HOME SITUATION  Type of Home: Apartment  Home Layout: One level  Stairs to Enter : 15   Railing: Yes              Lives With: Family (Granddaughter, pt reports she is her caregiver)    Drives: No   Patient Regularly Uses: None      Prior Level of [place]: Pt reports mod I with use of rollator for all mobility. Pt also admits to furniture ambulating in home. Pt is typically able to dress and bath self, but granddtr (who is pt's caregiver) will assist on days she is not feeling well. Pt either sleeps in bed or recliner. Pt denies history of falls in last 6 months. Pt reports she has to \"crawl up\" the stairs to her apartment (stairs are in interior hallway).    SUBJECTIVE  \"I hurts all over\"    OBJECTIVE  Precautions: Bed/chair alarm  Fall Risk: High fall risk    WEIGHT BEARING RESTRICTION     PAIN ASSESSMENT  Rating:  (does not rate)  Location: upper back, neck, UE's  Management Techniques: Activity promotion;Repositioning    COGNITION  Overall Cognitive Status:  WFL - within functional limits    RANGE OF MOTION AND STRENGTH ASSESSMENT  Upper extremity ROM and strength except B shoulder ROM grossly 80 deg limited by pain.    Lower extremity ROM is within functional limits     Lower extremity strength is within functional limits     BALANCE  Static Sitting: Good  Dynamic Sitting: Not tested  Static Standing: Fair -  Dynamic Standing: Fair -        ACTIVITY TOLERANCE  Pulse: 66        BP: 157/82  BP Location: Right arm  BP Method: Automatic  Patient Position: Sitting    O2 WALK  Oxygen Therapy  SPO2% on Room Air at Rest:  (wfl)    NEUROLOGICAL FINDINGS  Neurological Findings: None                     AM-PAC '6-Clicks' INPATIENT SHORT FORM - BASIC MOBILITY  How much difficulty does the patient currently have...  Patient Difficulty:  Turning over in bed (including adjusting bedclothes, sheets and blankets)?: None   Patient Difficulty: Sitting down on and standing up from a chair with arms (e.g., wheelchair, bedside commode, etc.): None   Patient Difficulty: Moving from lying on back to sitting on the side of the bed?: None   How much help from another person does the patient currently need...   Help from Another: Moving to and from a bed to a chair (including a wheelchair)?: A Little   Help from Another: Need to walk in hospital room?: A Little   Help from Another: Climbing 3-5 steps with a railing?: A Lot     AM-PAC Score:  Raw Score: 20   Approx Degree of Impairment: 35.83%   Standardized Score (AM-PAC Scale): 47.67   CMS Modifier (G-Code): CJ    FUNCTIONAL ABILITY STATUS  Gait Assessment   Functional Mobility/Gait Assessment  Gait Assistance: Contact guard assist  Distance (ft): 30  Assistive Device: Rolling walker  Pattern: Shuffle (slow louise)    Skilled Therapy Provided     Bed Mobility:  Rolling: mod I with railing  Supine to sit: mod I with railing and HOB elevated   Sit to supine: mod I with railing and HOB elevated  *slow movement pattern     Transfer Mobility:  Sit to stand: sba   Stand to sit: sba  Gait = cga with cueing to keep hands on walker, gaze stabilization due to dizziness.     Therapist's Comments: Pt presents semi-reclined in bed, c/o pain in upper back and neck. Moblity as above. Reports mild dizziness in sitting, stable with position changes. Pt educated on importance of neck /UE rom due to stiffness and pain, importance of mobility and ambulating with staff. Pt verbalizes understanding. Slow movement patterns throughout and requires encouragement to increase activity tolerance with this writer. MD present during part of sesison. Pt will benefit from stair training next session.    Exercise/Education Provided:  Bed mobility  Body mechanics  Energy conservation  Functional activity tolerated  Gait  training  Posture  ROM  Transfer training    Patient End of Session: In bed;Needs met;Call light within reach;RN aware of session/findings;All patient questions and concerns addressed;Hospital anti-slip socks;Alarm set      Patient Evaluation Complexity Level:  History Moderate - 1 or 2 personal factors and/or co-morbidities   Examination of body systems Low -  addressing 1-2 elements   Clinical Presentation Low- Stable   Clinical Decision Making Low Complexity       PT Session Time: 25 minutes  Gait Training: 10 minutes  Therapeutic Activity: 10 minutes

## 2024-10-06 NOTE — PLAN OF CARE
Pt is ok to discharge per primary and consults. Discharge instructions including meds & follow ups given. Patient verbalizes understanding & questions answered. IV removed, tele monitor discontinued, all belongings taken with patient. Pt transported off unit via wheelchair.

## 2024-10-06 NOTE — DISCHARGE SUMMARY
[facility]     Nadege Cruz Patient Status:  Observation    10/14/1955 MRN [id number]   Location [facility] 2NE-A Attending Josh Haywood MD   Hosp Day # 0 PCP Kory Cleary MD     Date of Admission: 10/4/2024  Date of Discharge:   ***    Discharge Disposition: Home or Self Care    Discharge Diagnosis:  ***    History of Present Illness: ***    Brief Synopsis: ***    Lace+ Score: 37  59-90 High Risk  29-58 Medium Risk  0-28   Low Risk       TCM Follow-Up Recommendation:  {Care Managers will evaluate the need for follow-up for all patients ages 50+, and high/moderate risk patients ages 25-49. Low risk patients (LACE < 29) will only be evaluated if the \"Still recommend for TCM follow-up\" option is selected from this list.:[id number]}      Procedures during hospitalization:   ***    Incidental or significant findings and recommendations (brief descriptions):  ***    Lab/Test results pending at Discharge:   ***    Consultants:  ***    Discharge Medication List:     Discharge Medications        CONTINUE taking these medications        Instructions Prescription details   apixaban 5 MG Tabs  Commonly known as: Eliquis      Take 1 tablet (5 mg total) by mouth 2 (two) times daily.   Refills: 0     aspirin 81 MG Tbec      Take 1 tablet (81 mg total) by mouth daily.   Refills: 0     atorvastatin 10 MG Tabs  Commonly known as: Lipitor      Take 1 tablet (10 mg total) by mouth at bedtime.   Refills: 0     docusate sodium 100 MG Caps  Commonly known as: COLACE      Take 1 capsule by mouth daily as needed.   Refills: 0     Flarex 0.1 % Susp  Generic drug: Fluorometholone Acetate      Place 1 drop into both eyes 2 (two) times daily. For dry eyes   Refills: 0     glipiZIDE 5 MG Tabs  Commonly known as: Glucotrol      Take 8 mg by mouth every morning before breakfast.   Refills: 0     hydrALAZINE 50 MG Tabs  Commonly known as: Apresoline      Take 1 tablet (50 mg total) by mouth daily.   Refills: 0      HYDROcodone-acetaminophen  MG Tabs  Commonly known as: Norco      Take 1 tablet by mouth every 4 (four) hours as needed for Pain.   Refills: 0     metFORMIN HCl 1000 MG Tabs  Commonly known as: GLUCOPHAGE      Take 1 tablet (1,000 mg total) by mouth 2 (two) times daily with meals.   Refills: 0     metoprolol succinate ER 50 MG Tb24  Commonly known as: Toprol XL      Take 1 tablet (50 mg total) by mouth in the morning and 1 tablet (50 mg total) before bedtime.   Refills: 0     olopatadine 0.1 % Soln  Commonly known as: Patanol      Place 1 drop into both eyes daily. For dry eyes   Refills: 0     semaglutide 8 MG/3ML Sopn  Commonly known as: Ozempic      Inject 2 mg into the skin once a week. Takes on Mondays, last dose 9/16   Refills: 0     Vitamin D3 1.25 MG (43285 UT) Caps      Take 1 capsule by mouth once a week. Tuesday   Refills: 0              [place] reviewed[place] ***    Follow-up appointment:   No follow-up provider specified.  Appointments for Next 30 Days 10/6/2024 - 11/5/2024      None              -----------------------------------------------------------------------------------------------  PATIENT DISCHARGE INSTRUCTIONS: See electronic chart    Josh Haywood MD    Total time spent on discharge planning:  *** minutes     The 21st Century Cures Act makes medical notes like these available to patients in the interest of transparency. Please be advised this is a medical document. Medical documents are intended to carry relevant information, facts as evident, and the clinical opinion of the practitioner. The medical note is intended as peer to peer communication and may appear blunt or direct. It is written in medical language and may contain abbreviations or verbiage that are unfamiliar.    [facility]  [address] [facility] [address]  [phone]    Follow up on 10/29/2024  2:50pm    Kory Cleary MD   [address] [place]  [facility] [address]  [facility] [address]  [phone]    Follow up in 1 week(s)      Appointments for Next 30 Days 10/8/2024 - 2024      None              -----------------------------------------------------------------------------------------------  PATIENT DISCHARGE INSTRUCTIONS: See electronic chart    Josh Haywood MD    Total time spent on discharge plannin minutes     The  Century Cures Act makes medical notes like these available to patients in the interest of transparency. Please be advised this is a medical document. Medical documents are intended to carry relevant information, facts as evident, and the clinical opinion of the practitioner. The medical note is intended as peer to peer communication and may appear blunt or direct. It is written in medical language and may contain abbreviations or verbiage that are unfamiliar.

## 2024-10-06 NOTE — PROGRESS NOTES
[facility]   part of [facility]     Hospitalist Progress Note     Nadege Cruz Patient Status:  Observation    10/14/1955 MRN [id number]   Location [facility] 2NE-A Attending Josh Haywood MD   Hosp Day # 0 PCP Kory Cleary MD     Chief Complaint: Chest pain    Subjective:   Patient with back pain.    Current medications:   insulin aspart  2-10 Units Subcutaneous TID AC and HS    aspirin  81 mg Oral Daily    apixaban  5 mg Oral BID    atorvastatin  10 mg Oral Nightly    hydrALAZINE  50 mg Oral Daily    metoprolol succinate ER  50 mg Oral 2x Daily(Beta Blocker)    ketotifen  1 drop Both Eyes BID    methocarbamol  500 mg Oral Q12H    insulin aspart  1-68 Units Subcutaneous TID CC    metoclopramide  5 mg Intravenous Q6H       Objective:    Review of Systems:   10 point ROS completed and was negative, except for pertinent positive and negatives stated in subjective.    Vital signs:  Temp:  [97.6 °F (36.4 °C)-99.4 °F (37.4 °C)] 97.7 °F (36.5 °C)  Pulse:  [60-97] 60  Resp:  [14-20] 18  BP: (134-188)/(63-83) 156/66  SpO2:  [93 %-98 %] 95 %  Patient Weight for the past 72 hrs:   Weight   10/04/24 2304 220 lb (99.8 kg)   10/05/24 0211 220 lb (99.8 kg)     Physical Exam:    General: No acute distress.   Respiratory: Clear to auscultation bilaterally.   Cardiovascular: S1, S2. Regular rate and rhythm.  Abdomen: Soft, nontender, nondistended.  Positive bowel sounds.   Extremities: No edema.  Diffuse tenderness to light palpation to neck, arms and limited back exam - able to ambulae  Neuro: AAOx3    Diagnostic Data:    Labs:  Recent Labs   Lab 10/04/24  2309   WBC 7.0   HGB 11.4*   MCV 83.2   .0       Recent Labs   Lab 10/04/24  2309 10/05/24  0820   * 152*   BUN 13 12   CREATSERUM 0.87 0.85   CA 9.9 9.7   ALB 4.0  --     136   K 4.4 4.1    103   CO2 27.0 27.0   ALKPHO 171*  --    AST 17  --    ALT 16  --    BILT 0.4  --    TP 8.0  --        Estimated Creatinine Clearance: 60.1  mL/min (based on SCr of 0.85 mg/dL).    No results for input(s): \"PTP\", \"INR\" in the last 168 hours.         COVID-19 Lab Results    COVID-19  Lab Results   Component Value Date    COVID19 Not Detected 10/25/2022    COVID19 Not Detected 09/19/2022       Pro-Calcitonin  No results for input(s): \"PCT\" in the last 168 hours.    Cardiac  No results for input(s): \"TROP\", \"PBNP\" in the last 168 hours.    Creatinine Kinase  No results for input(s): \"CK\" in the last 168 hours.    Inflammatory Markers  Recent Labs   Lab 10/05/24  1005   DDIMER 0.89*       Recent Labs   Lab 10/04/24  2309 10/05/24  0406 10/05/24  0820   TROPHS 3 <3 <3       Imaging: Imaging data reviewed in Epic.    Medications:    insulin aspart  2-10 Units Subcutaneous TID AC and HS    aspirin  81 mg Oral Daily    apixaban  5 mg Oral BID    atorvastatin  10 mg Oral Nightly    hydrALAZINE  50 mg Oral Daily    metoprolol succinate ER  50 mg Oral 2x Daily(Beta Blocker)    ketotifen  1 drop Both Eyes BID    methocarbamol  500 mg Oral Q12H    insulin aspart  1-68 Units Subcutaneous TID CC    metoclopramide  5 mg Intravenous Q6H       Assessment & Plan:    Chest pain, trop neg, atypical  SVT sp ablation 9/2024  SSS sp PPM  Essential hypertension  Dyslipidemia  Aspirin  Toprol, Hydralazine  Lipitor  Monitor hemodynamics  Telemetry  Cardiology consult   Back pain, diffuse muscle aches  Tylenol to PRN, add PTA Norco PRN  Stop Robaxin  Flex PRN  Stop IV Dilaudid  VTE on DOAC  DOAC  Diabetes mellitus  Correctional scale   Carb scale  Constipation  Bowel care  AMITA  Depression    Supplementary Documentation:   Quality:  DVT Prophylaxis: DOAC    Home today.    Plan of care discussed with patient and RN.    Josh Haywood MD

## 2024-10-06 NOTE — PLAN OF CARE
Patient is alert, oriented x4. On room air, CPAP at night maintaining SPO2 >90%. Up with SBA. Complaining of neck, back and body pain- scheduled pain medication, dilaudid x1  given- effective. Tele showing NSR, 1DAVB-A paced. Accucheck done and insulin given per parameter.    All concern and question addressed. Kept monitored.       Problem: Diabetes/Glucose Control  Goal: Glucose maintained within prescribed range  Description: INTERVENTIONS:  - Monitor Blood Glucose as ordered  - Assess for signs and symptoms of hyperglycemia and hypoglycemia  - Administer ordered medications to maintain glucose within target range  - Assess barriers to adequate nutritional intake and initiate nutrition consult as needed  - Instruct patient on self management of diabetes  Outcome: Progressing     Problem: Patient/Family Goals  Goal: Patient/Family Long Term Goal  Description: Patient's Long Term Goal: To go home  Stay out of hospital 10-5    Interventions:  - medication as prescribed  - testing as ordered  - doctors to see  - follow ups    - See additional Care Plan goals for specific interventions  Outcome: Progressing     Problem: RESPIRATORY - ADULT  Goal: Achieves optimal ventilation and oxygenation  Description: INTERVENTIONS:  - Assess for changes in respiratory status  - Assess for changes in mentation and behavior  - Position to facilitate oxygenation and minimize respiratory effort  - Oxygen supplementation based on oxygen saturation or ABGs  - Provide Smoking Cessation handout, if applicable  - Encourage broncho-pulmonary hygiene including cough, deep breathe, Incentive Spirometry  - Assess the need for suctioning and perform as needed  - Assess and instruct to report SOB or any respiratory difficulty  - Respiratory Therapy support as indicated  - Manage/alleviate anxiety  - Monitor for signs/symptoms of CO2 retention  Outcome: Progressing     Problem: CARDIOVASCULAR - ADULT  Goal: Maintains optimal cardiac output and  hemodynamic stability  Description: INTERVENTIONS:  - Monitor vital signs, rhythm, and trends  - Monitor for bleeding, hypotension and signs of decreased cardiac output  - Evaluate effectiveness of vasoactive medications to optimize hemodynamic stability  - Monitor arterial and/or venous puncture sites for bleeding and/or hematoma  - Assess quality of pulses, skin color and temperature  - Assess for signs of decreased coronary artery perfusion - ex. Angina  - Evaluate fluid balance, assess for edema, trend weights  Outcome: Progressing  Goal: Absence of cardiac arrhythmias or at baseline  Description: INTERVENTIONS:  - Continuous cardiac monitoring, monitor vital signs, obtain 12 lead EKG if indicated  - Evaluate effectiveness of antiarrhythmic and heart rate control medications as ordered  - Initiate emergency measures for life threatening arrhythmias  - Monitor electrolytes and administer replacement therapy as ordered  Outcome: Progressing

## 2024-10-06 NOTE — CM/SW NOTE
Transportation, food and utilities information to follow up with to address SDOH concerns listed on AVS.    Patient requesting transportation home--[facility] arranged for 1300 --PCS form completed--payment not expected @ time of service--patient to be billed about $90.oo--patient updated of cost--requested cancellation of maxim--she will find her own transportation

## 2024-10-06 NOTE — PROGRESS NOTES
Progress Note  Nadege Cruz Patient Status:  Observation    10/14/1955 MRN [id number]   Location [facility] 2NE-A Attending Josh Haywood MD   Hosp Day # 0 PCP Kory Cleary MD     Subjective:  In bed on exam. C/o whole body pain.     Objective:  /72 (BP Location: Right arm)   Pulse 60   Temp 98 °F (36.7 °C) (Oral)   Resp 18   Wt 220 lb (99.8 kg)   SpO2 95%   BMI 35.51 kg/m²     Telemetry: nsr, intermittent a paced      Intake/Output:    Intake/Output Summary (Last 24 hours) at 10/6/2024 0702  Last data filed at 10/6/2024 0600  Gross per 24 hour   Intake 415 ml   Output 950 ml   Net -535 ml       Last 3 Weights   10/05/24 0211 220 lb (99.8 kg)   10/04/24 2304 220 lb (99.8 kg)   24 1513 225 lb (102.1 kg)   23 2002 240 lb (108.9 kg)       Labs:  Recent Labs   Lab 10/04/24  2309 10/05/24  0820   * 152*   BUN 13 12   CREATSERUM 0.87 0.85   EGFRCR 73 75   CA 9.9 9.7    136   K 4.4 4.1    103   CO2 27.0 27.0     Recent Labs   Lab 10/04/24  2309   RBC 3.99   HGB 11.4*   HCT 33.2*   MCV 83.2   MCH 28.6   MCHC 34.3   RDW 14.3   NEPRELIM 4.32   WBC 7.0   .0         Recent Labs   Lab 10/04/24  2309 10/05/24  0406 10/05/24  0820   TROPHS 3 <3 <3       Review of Systems   Cardiovascular: Negative.    Respiratory: Negative.     Musculoskeletal:  Positive for myalgias.       Physical Exam:    Gen: alert, oriented x 3, NAD  Heent: pupils equal, reactive. Mucous membranes moist.   Neck: no jvd  Cardiac: regular rate and rhythm, normal S1,S2  Lungs: CTA  Abd: soft, NT/ND +bs  Ext: no edema  Skin: Warm, dry  Neuro: No focal deficits        Medications:     insulin aspart  2-10 Units Subcutaneous TID AC and HS    aspirin  81 mg Oral Daily    apixaban  5 mg Oral BID    atorvastatin  10 mg Oral Nightly    hydrALAZINE  50 mg Oral Daily    metoprolol succinate ER  50 mg Oral 2x Daily(Beta Blocker)    ketotifen  1 drop Both Eyes BID    methocarbamol  500 mg Oral Q12H    insulin  aspart  1-68 Units Subcutaneous TID CC    metoclopramide  5 mg Intravenous Q6H             Assessment:  Chest pain  Cardiac PET 3/2024-normal.   Mildly elevated d dimer. VQ scan negative.   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 Per primary  Hx VTE on eliquis    Plan:  No further cardiac work up needed.   VQ scan negative.   Cardiology will sign off, please call with additional questions.    Plan of care discussed with patient, RN.    Bree Sousa, APRN  10/6/2024  7:02 AM

## 2024-10-06 NOTE — PLAN OF CARE
Received pt at 0730.   Pt is A&Ox4, pain at chest/ABD/ neck- MD aware. On room air, lungs are clear/diminished, no coughing. NSR. Continent of B&B, active bowel sounds, last BM 10-3: miralax provided. BLE edema. Pt updated with plan of care.     Approx 1200- pt refusing medicar/ pricing- will have daughter in law pick her up between 1414-1574. Notified SW of information pertaining to house house help/finances. Provided on AVS by SW.     Approx 1400- note added by PT. Per SW will reach out to the pt once home in regards to setting up HHPT.       Problem: Diabetes/Glucose Control  Goal: Glucose maintained within prescribed range  Description: INTERVENTIONS:  - Monitor Blood Glucose as ordered  - Assess for signs and symptoms of hyperglycemia and hypoglycemia  - Administer ordered medications to maintain glucose within target range  - Assess barriers to adequate nutritional intake and initiate nutrition consult as needed  - Instruct patient on self management of diabetes  Outcome: Progressing     Problem: Patient/Family Goals  Goal: Patient/Family Long Term Goal  Description: Patient's Long Term Goal: To go home  Stay out of hospital 10-5    Interventions:  - medication as prescribed  - testing as ordered  - doctors to see  - follow ups    - See additional Care Plan goals for specific interventions  Outcome: Progressing  Goal: Patient/Family Short Term Goal  Description: Patient's Short Term Goal: to be out of pain  Control pain/ talk with MD 10-5  Go home today 10-6    Interventions:   - Medications as prescribed  - Doctors to see  - Testing as ordered  - PRN dilaudid, monitor pain- discuss with MD    - See additional Care Plan goals for specific interventions  Outcome: Progressing     Problem: RESPIRATORY - ADULT  Goal: Achieves optimal ventilation and oxygenation  Description: INTERVENTIONS:  - Assess for changes in respiratory status  - Assess for changes in mentation and behavior  - Position to facilitate  oxygenation and minimize respiratory effort  - Oxygen supplementation based on oxygen saturation or ABGs  - Provide Smoking Cessation handout, if applicable  - Encourage broncho-pulmonary hygiene including cough, deep breathe, Incentive Spirometry  - Assess the need for suctioning and perform as needed  - Assess and instruct to report SOB or any respiratory difficulty  - Respiratory Therapy support as indicated  - Manage/alleviate anxiety  - Monitor for signs/symptoms of CO2 retention  Outcome: Progressing     Problem: CARDIOVASCULAR - ADULT  Goal: Maintains optimal cardiac output and hemodynamic stability  Description: INTERVENTIONS:  - Monitor vital signs, rhythm, and trends  - Monitor for bleeding, hypotension and signs of decreased cardiac output  - Evaluate effectiveness of vasoactive medications to optimize hemodynamic stability  - Monitor arterial and/or venous puncture sites for bleeding and/or hematoma  - Assess quality of pulses, skin color and temperature  - Assess for signs of decreased coronary artery perfusion - ex. Angina  - Evaluate fluid balance, assess for edema, trend weights  Outcome: Progressing  Goal: Absence of cardiac arrhythmias or at baseline  Description: INTERVENTIONS:  - Continuous cardiac monitoring, monitor vital signs, obtain 12 lead EKG if indicated  - Evaluate effectiveness of antiarrhythmic and heart rate control medications as ordered  - Initiate emergency measures for life threatening arrhythmias  - Monitor electrolytes and administer replacement therapy as ordered  Outcome: Progressing

## 2024-10-06 NOTE — CM/SW NOTE
Just noted therapy recommendation for HHPT.  Order and face to face obtained--referrals sent in [facility]--CM/SW to update patient with accepting agencies as patient leaving hospital now

## 2024-10-07 LAB
ATRIAL RATE: 67 BPM
P AXIS: 30 DEGREES
P-R INTERVAL: 232 MS
Q-T INTERVAL: 392 MS
QRS DURATION: 86 MS
QTC CALCULATION (BEZET): 414 MS
R AXIS: 34 DEGREES
T AXIS: 20 DEGREES
VENTRICULAR RATE: 67 BPM

## 2024-10-07 NOTE — HOME CARE LIAISON
Reached out to patient and inquired about home health care services. Patient indicated that her granddaughter is her care taker and does not want services. Informed JANE King

## (undated) DEVICE — PACK SRG BIOM FULL DRP STRL

## (undated) DEVICE — CANNULA ANTERIOR CHAMBER 27GA

## (undated) DEVICE — IOL MANIPULOATOR [SINSKEY] BLUNT TIP: Brand: VISITEC®

## (undated) DEVICE — BLADE KNIFE SLIT ANG 2.5

## (undated) DEVICE — RETINAL: Brand: MEDLINE INDUSTRIES, INC.

## (undated) DEVICE — 3 ML SYRINGE LUER-LOCK TIP: Brand: MONOJECT

## (undated) DEVICE — CANNULA HYDRODISSECTION

## (undated) DEVICE — 25+®GA HYPERVIT®  BEVEL 20K CPM TOTAL PLUS® VITRECTOMY PAK: Brand: CONSTELLATION® HYPERVIT® TOTAL PLUS®

## (undated) DEVICE — SOLUTION IRR BSS+

## (undated) DEVICE — Device

## (undated) DEVICE — CLEARCUT® SIDEPORT KNIFE DUAL BEVEL 1.0MM ANGLED: Brand: CLEARCUT®

## (undated) DEVICE — KIT 25+GA VITRECTOMY

## (undated) DEVICE — CANNULA CYSTOME IRRIG 25GAX5/8

## (undated) DEVICE — ENCORE® LATEX MICRO SIZE 7.5, STERILE LATEX POWDER-FREE SURGICAL GLOVE: Brand: ENCORE

## (undated) DEVICE — SYRINGE TB 29G

## (undated) DEVICE — PREP BETADINE SOL 5% EYE

## (undated) DEVICE — COVER SGL STRL LGHT HNDL BLU

## (undated) DEVICE — 6 ML SYRINGE LUER-LOCK TIP: Brand: MONOJECT

## (undated) DEVICE — PROBE LASER ENDO DISP 25 GA

## (undated) DEVICE — SOLUTION IRR BTL 250CC NACL

## (undated) DEVICE — APPLICATOR COTTON TIP 3 10/PK

## (undated) DEVICE — SOLUTION  .9 1000ML BTL

## (undated) DEVICE — STERILE WATER 1000ML BTL

## (undated) DEVICE — THE MONARCH® III "C" CARTRIDGE IS A SINGLE-USE POLYPROPYLENE CARTRIDGE FOR POSTERIOR CHAMBER IOL DELIVERY.: Brand: MONARCH®

## (undated) DEVICE — COTTON TIPPED APPLICATOR 3IN

## (undated) DEVICE — 3M™ MICROPORE™ TAPE, 1530-2: Brand: 3M™ MICROPORE™

## (undated) DEVICE — Device: Brand: JELCO

## (undated) DEVICE — THE MONARCH® "D" CARTRIDGE IS A SINGLE-USE POLYPROPYLENE CARTRIDGE FOR POSTERIOR CHAMBER IOL DELIVERY: Brand: MONARCH® III